# Patient Record
Sex: MALE | Race: WHITE | Employment: UNEMPLOYED | URBAN - METROPOLITAN AREA
[De-identification: names, ages, dates, MRNs, and addresses within clinical notes are randomized per-mention and may not be internally consistent; named-entity substitution may affect disease eponyms.]

---

## 2017-01-05 ENCOUNTER — OFFICE VISIT (OUTPATIENT)
Dept: INTERNAL MEDICINE CLINIC | Age: 47
End: 2017-01-05

## 2017-01-05 VITALS
HEART RATE: 78 BPM | HEIGHT: 72 IN | DIASTOLIC BLOOD PRESSURE: 106 MMHG | OXYGEN SATURATION: 94 % | SYSTOLIC BLOOD PRESSURE: 152 MMHG | BODY MASS INDEX: 42.66 KG/M2 | WEIGHT: 315 LBS | TEMPERATURE: 98.6 F | RESPIRATION RATE: 16 BRPM

## 2017-01-05 DIAGNOSIS — E11.9 CONTROLLED TYPE 2 DIABETES MELLITUS WITHOUT COMPLICATION, WITHOUT LONG-TERM CURRENT USE OF INSULIN (HCC): Primary | ICD-10-CM

## 2017-01-05 DIAGNOSIS — M54.16 LUMBAR RADICULOPATHY: ICD-10-CM

## 2017-01-05 DIAGNOSIS — M51.37 DEGENERATIVE DISC DISEASE AT L5-S1 LEVEL: ICD-10-CM

## 2017-01-05 DIAGNOSIS — I10 ESSENTIAL HYPERTENSION: ICD-10-CM

## 2017-01-05 LAB
CHOLEST SERPL-MCNC: 169 MG/DL
GLUCOSE POC: 150 MG/DL
HDLC SERPL-MCNC: 44 MG/DL
LDL CHOLESTEROL POC: 73
NON-HDL GOAL (POC): 126
TCHOL/HDL RATIO (POC): 3.9
TRIGL SERPL-MCNC: 265 MG/DL

## 2017-01-05 RX ORDER — AMLODIPINE BESYLATE 5 MG/1
5 TABLET ORAL DAILY
Qty: 30 TAB | Refills: 12 | Status: SHIPPED | OUTPATIENT
Start: 2017-01-05 | End: 2017-02-07 | Stop reason: ALTCHOICE

## 2017-01-05 RX ORDER — ACETAMINOPHEN AND CODEINE PHOSPHATE 300; 30 MG/1; MG/1
1 TABLET ORAL
Qty: 30 TAB | Refills: 0 | Status: SHIPPED | OUTPATIENT
Start: 2017-01-05 | End: 2017-03-10

## 2017-01-05 NOTE — PROGRESS NOTES
Karen Saleh is a 40 y.o. male and presents with Diabetes and Hypertension  . Subjective:  Hypertension Review:  The patient has hypertension . Diet and Lifestyle: generally follows a low sodium diet, exercises sporadically  Home BP Monitoring: is not measured at home. Pertinent ROS: taking medications as instructed, no medication side effects noted, no TIA's, no chest pain on exertion, no dyspnea on exertion, no swelling of ankles. Diabetes Mellitus Review:  He has diabetes mellitus. Diabetic ROS - medication compliance: compliant all of the time, diabetic diet compliance: compliant all of the time, home glucose monitoring: is performed. Known diabetic complications: none  Cardiovascular risk factors: family history, dyslipidemia, diabetes mellitus, obesity, hypertension  Current diabetic medications include oral agents/insulin   Eye exam current (within one year): no  Weight trend: stable  Prior visit with dietician: no  Current diet: \"healthy\" diet  in general  Current exercise: walking  Current monitoring regimen: home blood tests - daily  Home blood sugar records: trend: stable  Any episodes of hypoglycemia? no  Is He on ACE inhibitor or angiotensin II receptor blocker? Yes       Back Pain Review:  Patient presents for evaluation of low back problems. Symptoms have been present for several months and include pain in lower back (dull, mild in character;10 /10 in severity). Initial inciting event: none. Symptoms are worst: at times. Alleviating factors identifiable by patient are lying flat, medication . Exacerbating factors identifiable by patient are bending forwards, bending backwards. Treatments so far initiated by patient: medication Previous lower back problems: reported. Previous workup: x-rays  He has pains radiating to the legs  He has had therapy with no relief. .         Review of Systems  Constitutional: negative for fevers, chills, anorexia and weight loss  Eyes:   negative for visual disturbance and irritation  ENT:   negative for tinnitus,sore throat,nasal congestion,ear pains. hoarseness  Respiratory:  negative for cough, hemoptysis, dyspnea,wheezing  CV:   negative for chest pain, palpitations, lower extremity edema  GI:   negative for nausea, vomiting, diarrhea, abdominal pain,melena  Endo:               negative for polyuria,polydipsia,polyphagia,heat intolerance  Genitourinary: negative for frequency, dysuria and hematuria  Integument:  negative for rash and pruritus  Hematologic:  negative for easy bruising and gum/nose bleeding  Musculoskel: myalgias, arthralgias, back pain, muscle weakness, joint pain  Neurological:  negative for headaches, dizziness, vertigo, memory problems and gait   Behavl/Psych: negative for feelings of anxiety, depression, mood changes    Past Medical History   Diagnosis Date    Diabetes ([address]      type 2    Hypertension      History reviewed. No pertinent past surgical history. Social History     Social History    Marital status: SINGLE     Spouse name: N/A    Number of children: N/A    Years of education: N/A     Social History Main Topics    Smoking status: Current Every Day Smoker     Packs/day: 1.00    Smokeless tobacco: Never Used    Alcohol use No      Comment: occ    Drug use: No    Sexual activity: Yes     Other Topics Concern    None     Social History Narrative     History reviewed. No pertinent family history. Current Outpatient Prescriptions   Medication Sig Dispense Refill    amLODIPine (NORVASC) 5 mg tablet Take 1 Tab by mouth daily. 30 Tab 12    acetaminophen-codeine (TYLENOL #3) 300-30 mg per tablet Take 1 Tab by mouth every four (4) hours as needed for Pain. Max Daily Amount: 6 Tabs. 30 Tab 0    metFORMIN (GLUCOPHAGE) 1,000 mg tablet Take 1 Tab by mouth two (2) times daily (with meals).  60 Tab 12    losartan (COZAAR) 100 mg tablet TAKE 1 TABLET BY MOUTH EVERY DAY(NON PAYMENT OF INS) 353 Tab 0    ketoconazole (NIZORAL) 2 % topical cream Apply  to affected area two (2) times a day. 30 g 12    glipiZIDE (GLUCOTROL) 5 mg tablet Take 1 Tab by mouth two (2) times a day. 60 Tab 12    ibuprofen (MOTRIN) 800 mg tablet Take 1 Tab by mouth two (2) times daily (after meals). 60 Tab 12    metaxalone (SKELAXIN) 800 mg tablet Take 1 Tab by mouth three (3) times daily. 12 Tab 0    insulin NPH/insulin regular (NOVOLIN 70/30, HUMULIN 70/30) 100 unit/mL (70-30) injection 20 units am,20 units pm 10 mL 12     Allergies   Allergen Reactions    Iodine Hives       Objective:  Visit Vitals    BP (!) 152/106    Pulse 78    Temp 98.6 °F (37 °C) (Oral)    Resp 16    Ht 6' (1.829 m)    Wt 335 lb (152 kg)    SpO2 94%    BMI 45.43 kg/m2     Physical Exam:   General appearance - alert, well appearing, and in no distress  Mental status - alert, oriented to person, place, and time  EYE-SILVIO, EOMI, corneas normal, no foreign bodies  ENT-ENT exam normal, no neck nodes or sinus tenderness  Nose - normal and patent, no erythema, discharge or polyps  Mouth - mucous membranes moist, pharynx normal without lesions  Neck - supple, no significant adenopathy   Chest - clear to auscultation, no wheezes, rales or rhonchi, symmetric air entry   Heart - normal rate, regular rhythm, normal S1, S2, no murmurs, rubs, clicks or gallops   Abdomen - soft, nontender, nondistended, no masses or organomegaly  Lymph- no adenopathy palpable  Ext-peripheral pulses normal, no pedal edema, no clubbing or cyanosis  Skin-Warm and dry.  no hyperpigmentation, vitiligo, or suspicious lesions  Neuro -alert, oriented, normal speech, no focal findings or movement disorder noted  Neck-normal C-spine, no tenderness, full ROM without pain  Back-tenderness lower lumbar spine and sacral spine      Results for orders placed or performed in visit on 01/05/17   AMB POC GLUCOSE BLOOD, BY GLUCOSE MONITORING DEVICE   Result Value Ref Range    Glucose  mg/dL   AMB POC LIPID PROFILE   Result Value Ref Range    Cholesterol (POC) 169     Triglycerides (POC) 265     HDL Cholesterol (POC) 44     LDL Cholesterol (POC) 73     Non-HDL Goal (POC) 126     TChol/HDL Ratio (POC) 3.9        Assessment/Plan:    ICD-10-CM ICD-9-CM    1. Controlled type 2 diabetes mellitus without complication, without long-term current use of insulin (HCC) E11.9 250.00 AMB POC GLUCOSE BLOOD, BY GLUCOSE MONITORING DEVICE      AMB POC HEMOGLOBIN A1C      AMB POC LIPID PROFILE   2. Essential hypertension I10 401.9 AMB POC GLUCOSE BLOOD, BY GLUCOSE MONITORING DEVICE      AMB POC HEMOGLOBIN A1C      AMB POC LIPID PROFILE   3. Degenerative disc disease at L5-S1 level M51.36 722. [address] CONT   4. Lumbar radiculopathy M54.16 724.4 MRI LUMB SPINE W WO CONT     Orders Placed This Encounter    MRI LUMB SPINE W WO CONT     Standing Status:   Future     Standing Expiration Date:   7/7/2017     Order Specific Question:   Reason for Exam     Answer:   pain,radiculopathy     Order Specific Question:   Is Patient Allergic to Contrast Dye? Answer:   No    AMB POC GLUCOSE BLOOD, BY GLUCOSE MONITORING DEVICE    AMB POC HEMOGLOBIN A1C    AMB POC LIPID PROFILE    amLODIPine (NORVASC) 5 mg tablet     Sig: Take 1 Tab by mouth daily. Dispense:  30 Tab     Refill:  12    acetaminophen-codeine (TYLENOL #3) 300-30 mg per tablet     Sig: Take 1 Tab by mouth every four (4) hours as needed for Pain. Max Daily Amount: 6 Tabs. Dispense:  30 Tab     Refill:  0     lose weight, increase physical activity, follow low fat diet, follow low salt diet, continue present plan,Take 81mg aspirin daily  There are no Patient Instructions on file for this visit. Follow-up Disposition:  Return in about 4 weeks (around 2/2/2017), or if symptoms worsen or fail to improve. I have reviewed with the patient details of the assessment and plan and all questions were answered.  Relevent patient education was performed    An After Visit Summary was printed and given to the patient.

## 2017-01-05 NOTE — MR AVS SNAPSHOT
Visit Information Date & Time Provider Department Dept. Phone Encounter #  
 1/5/2017  1:30 PM You Sanchez MD [address] [facility] [phone] [id number] Follow-up Instructions Return in about 4 weeks (around 2/2/2017), or if symptoms worsen or fail to improve. Upcoming Health Maintenance Date Due  
 EYE EXAM RETINAL OR DILATED Q1 2/20/1981 HEMOGLOBIN A1C Q6M 12/28/2016 MICROALBUMIN Q1 5/31/2017 LIPID PANEL Q1 5/31/2017 FOOT EXAM Q1 6/28/2017 DTaP/Tdap/Td series (2 - Td) 12/15/2020 Allergies as of 1/5/2017  Review Complete On: 1/5/2017 By: You Sanchez MD  
  
 Severity Noted Reaction Type Reactions Iodine  10/21/2011    Hives Current Immunizations  Reviewed on 12/15/2015 Name Date DTaP 12/15/2010 Not reviewed this visit You Were Diagnosed With   
  
 Codes Comments Controlled type 2 diabetes mellitus without complication, without long-term current use of insulin ([facility]    -  Primary ICD-10-CM: E11.9 ICD-9-CM: 250.00 Essential hypertension     ICD-10-CM: I10 
ICD-9-CM: 401.9 Degenerative disc disease at L5-S1 level     ICD-10-CM: M51.36 
ICD-9-CM: 722.52 Lumbar radiculopathy     ICD-10-CM: M54.16 
ICD-9-CM: 724.4 Vitals BP Pulse Temp Resp Height(growth percentile) Weight(growth percentile) (!) 152/106 78 98.6 °F (37 °C) (Oral) 16 6' (1.829 m) 335 lb (152 kg) SpO2 BMI Smoking Status 94% 45.43 kg/m2 Current Every Day Smoker Vitals History BMI and BSA Data Body Mass Index Body Surface Area 45.43 kg/m 2 2.78 m 2 Preferred Pharmacy Pharmacy Name Phone [facility] [place] Your Updated Medication List  
  
   
This list is accurate as of: 1/5/17  2:18 PM.  Always use your most recent med list.  
  
  
  
  
 acetaminophen-codeine 300-30 mg per tablet Commonly known as:  TYLENOL #3 Take 1 Tab by mouth every four (4) hours as needed for Pain. Max Daily Amount: 6 Tabs. amLODIPine 5 mg tablet Commonly known as:  Garcia Mullet Take 1 Tab by mouth daily. glipiZIDE 5 mg tablet Commonly known as:  Melinda Dayhoff Take 1 Tab by mouth two (2) times a day. ibuprofen 800 mg tablet Commonly known as:  MOTRIN Take 1 Tab by mouth two (2) times daily (after meals). insulin NPH/insulin regular 100 unit/mL (70-30) injection Commonly known as:  NOVOLIN 70/30, HUMULIN 70/30  
20 units am,20 units pm  
  
 ketoconazole 2 % topical cream  
Commonly known as:  NIZORAL Apply  to affected area two (2) times a day. losartan 100 mg tablet Commonly known as:  COZAAR  
TAKE 1 TABLET BY MOUTH EVERY DAY(NON PAYMENT OF INS) metaxalone 800 mg tablet Commonly known as:  SKELAXIN Take 1 Tab by mouth three (3) times daily. metFORMIN 1,000 mg tablet Commonly known as:  GLUCOPHAGE Take 1 Tab by mouth two (2) times daily (with meals). Prescriptions Printed Refills  
 acetaminophen-codeine (TYLENOL #3) 300-30 mg per tablet 0 Sig: Take 1 Tab by mouth every four (4) hours as needed for Pain. Max Daily Amount: 6 Tabs. Class: Print Route: Oral  
  
Prescriptions Sent to Pharmacy Refills  
 amLODIPine (NORVASC) 5 mg tablet 12 Sig: Take 1 Tab by mouth daily. Class: Normal  
 Pharmacy: [facility] [address] [facility], [address] [facility] Ph #: [phone] Route: Oral  
  
We Performed the Following AMB POC GLUCOSE BLOOD, BY GLUCOSE MONITORING DEVICE [36044 CPT(R)] AMB POC HEMOGLOBIN A1C [01627 CPT(R)] AMB POC LIPID PROFILE [01183 CPT(R)] Follow-up Instructions Return in about 4 weeks (around 2/2/2017), or if symptoms worsen or fail to improve. To-Do List   
 01/05/2017 Imaging:  MRI LUMB SPINE W WO CONT Please provide this summary of care documentation to your next provider. Your primary care clinician is listed as Thuan Lowry. If you have any questions after today's visit, please call [phone].

## 2017-01-18 ENCOUNTER — HOSPITAL ENCOUNTER (OUTPATIENT)
Dept: MRI IMAGING | Age: 47
Discharge: HOME | End: 2017-01-18
Attending: INTERNAL MEDICINE
Payer: COMMERCIAL

## 2017-01-18 DIAGNOSIS — M51.37 DEGENERATIVE DISC DISEASE AT L5-S1 LEVEL: ICD-10-CM

## 2017-01-18 DIAGNOSIS — M54.16 LUMBAR RADICULOPATHY: ICD-10-CM

## 2017-01-18 PROCEDURE — 72148 MRI LUMBAR SPINE W/O DYE: CPT

## 2017-02-07 ENCOUNTER — OFFICE VISIT (OUTPATIENT)
Dept: INTERNAL MEDICINE CLINIC | Age: 47
End: 2017-02-07

## 2017-02-07 VITALS
SYSTOLIC BLOOD PRESSURE: 150 MMHG | RESPIRATION RATE: 20 BRPM | OXYGEN SATURATION: 87 % | DIASTOLIC BLOOD PRESSURE: 90 MMHG | WEIGHT: 315 LBS | TEMPERATURE: 98.7 F | HEART RATE: 84 BPM | BODY MASS INDEX: 42.66 KG/M2 | HEIGHT: 72 IN

## 2017-02-07 DIAGNOSIS — M51.06 LUMBAR DISC HERNIATION WITH MYELOPATHY: Primary | ICD-10-CM

## 2017-02-07 DIAGNOSIS — E11.9 CONTROLLED TYPE 2 DIABETES MELLITUS WITHOUT COMPLICATION, UNSPECIFIED LONG TERM INSULIN USE STATUS: ICD-10-CM

## 2017-02-07 LAB — GLUCOSE POC: 129 MG/DL

## 2017-02-07 NOTE — MR AVS SNAPSHOT
Visit Information Date & Time Provider Department Dept. Phone Encounter #  
 2/7/2017  9:30 AM You Sanchez MD [address] [facility] [phone] [id number] Follow-up Instructions Return in about 4 weeks (around 3/7/2017), or if symptoms worsen or fail to improve. Upcoming Health Maintenance Date Due  
 EYE EXAM RETINAL OR DILATED Q1 2/20/1981 HEMOGLOBIN A1C Q6M 12/28/2016 MICROALBUMIN Q1 5/31/2017 FOOT EXAM Q1 6/28/2017 LIPID PANEL Q1 1/5/2018 DTaP/Tdap/Td series (2 - Td) 12/15/2020 Allergies as of 2/7/2017  Review Complete On: 2/7/2017 By: Rosario Elias LPN Severity Noted Reaction Type Reactions Iodine  10/21/2011    Hives Current Immunizations  Reviewed on 12/15/2015 Name Date DTaP 12/15/2010 Not reviewed this visit You Were Diagnosed With   
  
 Codes Comments Lumbar disc herniation with myelopathy    -  Primary ICD-10-CM: M51.06 
ICD-9-CM: 722.73 Controlled type 2 diabetes mellitus without complication, unspecified long term insulin use status ([facility]     ICD-10-CM: E11.9 ICD-9-CM: 250.00 Vitals BP Pulse Temp Resp Height(growth percentile) Weight(growth percentile) 150/90 (BP 1 Location: Right arm, BP Patient Position: Sitting) 84 98.7 °F (37.1 °C) (Oral) 20 6' (1.829 m) (!) 356 lb (161.5 kg) SpO2 BMI Smoking Status (!) 87% 48.28 kg/m2 Current Every Day Smoker Vitals History BMI and BSA Data Body Mass Index Body Surface Area  
 48.28 kg/m 2 2.86 m 2 Preferred Pharmacy Pharmacy Name Phone [facility] [address], [place] Your Updated Medication List  
  
   
This list is accurate as of: 2/7/17 10:49 AM.  Always use your most recent med list.  
  
  
  
  
 acetaminophen-codeine 300-30 mg per tablet Commonly known as:  TYLENOL #3  
 Take 1 Tab by mouth every four (4) hours as needed for Pain. Max Daily Amount: 6 Tabs. glipiZIDE 5 mg tablet Commonly known as:  Marissa Betters Take 1 Tab by mouth two (2) times a day. ibuprofen 800 mg tablet Commonly known as:  MOTRIN Take 1 Tab by mouth two (2) times daily (after meals). insulin NPH/insulin regular 100 unit/mL (70-30) injection Commonly known as:  NOVOLIN 70/30, HUMULIN 70/30  
20 units am,20 units pm  
  
 ketoconazole 2 % topical cream  
Commonly known as:  NIZORAL Apply  to affected area two (2) times a day. losartan 100 mg tablet Commonly known as:  COZAAR  
TAKE 1 TABLET BY MOUTH EVERY DAY(NON PAYMENT OF INS) metaxalone 800 mg tablet Commonly known as:  SKELAXIN Take 1 Tab by mouth three (3) times daily. metFORMIN 1,000 mg tablet Commonly known as:  GLUCOPHAGE Take 1 Tab by mouth two (2) times daily (with meals). We Performed the Following AMB POC GLUCOSE BLOOD, BY GLUCOSE MONITORING DEVICE [71176 CPT(R)] REFERRAL TO NEUROSURGERY [DFE10 Custom] Follow-up Instructions Return in about 4 weeks (around 3/7/2017), or if symptoms worsen or fail to improve. Referral Information Referral ID Referred By Referred To  
  
 [id number] Mikhail Dillard MD   
   [facility] [place], [address] [place] [address] Phone: [phone] Fax: [phone] Visits Status Start Date End Date 1 New Request 2/7/17 2/7/18 If your referral has a status of pending review or denied, additional information will be sent to support the outcome of this decision. Patient Instructions [employer] Activation Thank you for requesting access to [employer]. Please follow the instructions below to securely access and download your online medical record. [employer] allows you to send messages to your doctor, view your test results, renew your prescriptions, schedule appointments, and more. How Do I Sign Up? 1. In your internet browser, go to [email or web address]. [employer] 
2. Click on the First Time User? Click Here link in the Sign In box. You will be redirect to the New Member Sign Up page. 3. Enter your [employer] Access Code exactly as it appears below. You will not need to use this code after youve completed the sign-up process. If you do not sign up before the expiration date, you must request a new code. MyChart Access Code: Activation code not generated Current [employer] Status: Patient Declined (This is the date your MyChart access code will ) 4. Enter the last four digits of your Social Security Number (xxxx) and Date of Birth (mm/dd/yyyy) as indicated and click Submit. You will be taken to the next sign-up page. 5. Create a [employer] ID. This will be your [employer] login ID and cannot be changed, so think of one that is secure and easy to remember. 6. Create a [employer] password. You can change your password at any time. 7. Enter your Password Reset Question and Answer. This can be used at a later time if you forget your password. 8. Enter your e-mail address. You will receive e-mail notification when new information is available in [address] 9. Click Sign Up. You can now view and download portions of your medical record. 10. Click the Download Summary menu link to download a portable copy of your medical information. Additional Information If you have questions, please visit the Frequently Asked Questions section of the [employer] website at [email or web address] Remember, [employer] is NOT to be used for urgent needs. For medical emergencies, dial 911. Please provide this summary of care documentation to your next provider. Your primary care clinician is listed as Thuan Lowry. If you have any questions after today's visit, please call [phone].

## 2017-02-07 NOTE — PATIENT INSTRUCTIONS
[employer] Activation    Thank you for requesting access to [facility]. Please follow the instructions below to securely access and download your online medical record. [employer] allows you to send messages to your doctor, view your test results, renew your prescriptions, schedule appointments, and more. How Do I Sign Up? 1. In your internet browser, go to [email or web address]  2. Click on the First Time User? Click Here link in the Sign In box. You will be redirect to the New Member Sign Up page. 3. Enter your [employer] Access Code exactly as it appears below. You will not need to use this code after youve completed the sign-up process. If you do not sign up before the expiration date, you must request a new code. [employer] Access Code: Activation code not generated  Current [employer] Status: Patient Declined (This is the date your [employer] access code will )    4. Enter the last four digits of your Social Security Number (xxxx) and Date of Birth (mm/dd/yyyy) as indicated and click Submit. You will be taken to the next sign-up page. 5. Create a [employer] ID. This will be your [employer] login ID and cannot be changed, so think of one that is secure and easy to remember. 6. Create a [employer] password. You can change your password at any time. 7. Enter your Password Reset Question and Answer. This can be used at a later time if you forget your password. 8. Enter your e-mail address. You will receive e-mail notification when new information is available in [address] 9. Click Sign Up. You can now view and download portions of your medical record. 10. Click the Download Summary menu link to download a portable copy of your medical information. Additional Information    If you have questions, please visit the Frequently Asked Questions section of the [employer] website at [email or web address] Remember, [employer] is NOT to be used for urgent needs. For medical emergencies, dial 911.

## 2017-02-07 NOTE — PROGRESS NOTES
Rosalee Watson is a 40 y.o. male and presents with Back Pain; Leg Pain; and Hypertension  . Subjective:  Hypertension Review:  The patient has hypertension . Diet and Lifestyle: generally follows a low sodium diet, exercises sporadically  Home BP Monitoring: is not measured at home. Pertinent ROS: taking medications as instructed, no medication side effects noted, no TIA's, no chest pain on exertion, no dyspnea on exertion, no swelling of ankles. Diabetes Mellitus Review:  He has diabetes mellitus. Diabetic ROS - medication compliance: compliant all of the time, diabetic diet compliance: compliant all of the time, home glucose monitoring: is performed. Known diabetic complications: none  Cardiovascular risk factors: family history, dyslipidemia, diabetes mellitus, obesity, hypertension  Current diabetic medications include oral agents/insulin   Eye exam current (within one year): no  Weight trend: stable  Prior visit with dietician: no  Current diet: \"healthy\" diet  in general  Current exercise: walking  Current monitoring regimen: home blood tests - daily  Home blood sugar records: trend: stable  Any episodes of hypoglycemia? no  Is He on ACE inhibitor or angiotensin II receptor blocker? Yes       Back Pain Review:  Patient presents for evaluation of low back problems. Symptoms have been present for several months and include pain in lower back (dull, mild in character;10 /10 in severity). Initial inciting event: none. Symptoms are worst: at times. Alleviating factors identifiable by patient are lying flat, medication . Exacerbating factors identifiable by patient are bending forwards, bending backwards. Treatments so far initiated by patient: medication Previous lower back problems: reported. Previous workup: x-rays  He has pains radiating to the legs  He has had therapy with no relief. Mabeline Sink    His most recent MRI:IMPRESSION  Impression:  Suboptimal exam secondary to motion patient body habitus.   1. Bilateral spondylolyses at L5 without subluxation. 2. Multilevel degenerative disc disease and degenerative changes. Severe  bilateral facet arthropathy at L4-L5. Moderate sized left paracentral disc  protrusion effacing the left lateral recess. Multilevel spinal canal stenosis  ranging from mild to moderate/severe, worst at L3-L4. Multilevel neuroforaminal  narrowing ranging from mild to moderate. 3. Lower than expected signal in the bone marrow on the T1 sequence. This can be  related to benign entities, such as anemia. Clinical correlation is recommended. Review of Systems  Constitutional: negative for fevers, chills, anorexia and weight loss  Eyes:   negative for visual disturbance and irritation  ENT:   negative for tinnitus,sore throat,nasal congestion,ear pains. hoarseness  Respiratory:  negative for cough, hemoptysis, dyspnea,wheezing  CV:   negative for chest pain, palpitations, lower extremity edema  GI:   negative for nausea, vomiting, diarrhea, abdominal pain,melena  Endo:               negative for polyuria,polydipsia,polyphagia,heat intolerance  Genitourinary: negative for frequency, dysuria and hematuria  Integument:  negative for rash and pruritus  Hematologic:  negative for easy bruising and gum/nose bleeding  Musculoskel: myalgias, arthralgias, back pain, muscle weakness, joint pain  Neurological:  negative for headaches, dizziness, vertigo, memory problems and gait   Behavl/Psych: negative for feelings of anxiety, depression, mood changes    Past Medical History   Diagnosis Date    Diabetes ([address]      type 2    Hypertension      History reviewed. No pertinent past surgical history.   Social History     Social History    Marital status: SINGLE     Spouse name: N/A    Number of children: N/A    Years of education: N/A     Social History Main Topics    Smoking status: Current Every Day Smoker     Packs/day: 1.00    Smokeless tobacco: Never Used    Alcohol use No      Comment: occ    Drug use: No    Sexual activity: Yes     Other Topics Concern    None     Social History Narrative     History reviewed. No pertinent family history. Current Outpatient Prescriptions   Medication Sig Dispense Refill    acetaminophen-codeine (TYLENOL #3) 300-30 mg per tablet Take 1 Tab by mouth every four (4) hours as needed for Pain. Max Daily Amount: 6 Tabs. 30 Tab 0    metFORMIN (GLUCOPHAGE) 1,000 mg tablet Take 1 Tab by mouth two (2) times daily (with meals). 60 Tab 12    glipiZIDE (GLUCOTROL) 5 mg tablet Take 1 Tab by mouth two (2) times a day. 60 Tab 12    losartan (COZAAR) 100 mg tablet TAKE 1 TABLET BY MOUTH EVERY DAY(NON PAYMENT OF INS) 353 Tab 0    ketoconazole (NIZORAL) 2 % topical cream Apply  to affected area two (2) times a day. 30 g 12    ibuprofen (MOTRIN) 800 mg tablet Take 1 Tab by mouth two (2) times daily (after meals). 60 Tab 12    metaxalone (SKELAXIN) 800 mg tablet Take 1 Tab by mouth three (3) times daily.  12 Tab 0    insulin NPH/insulin regular (NOVOLIN 70/30, HUMULIN 70/30) 100 unit/mL (70-30) injection 20 units am,20 units pm 10 mL 12     Allergies   Allergen Reactions    Iodine Hives       Objective:  Visit Vitals    /90 (BP 1 Location: Right arm, BP Patient Position: Sitting)    Pulse 84    Temp 98.7 °F (37.1 °C) (Oral)    Resp 20    Ht 6' (1.829 m)    Wt (!) 356 lb (161.5 kg)    SpO2 (!) 87%    BMI 48.28 kg/m2     Physical Exam:   General appearance - alert, well appearing, and in no distress  Mental status - alert, oriented to person, place, and time  EYE-SILVIO, EOMI, corneas normal, no foreign bodies  ENT-ENT exam normal, no neck nodes or sinus tenderness  Nose - normal and patent, no erythema, discharge or polyps  Mouth - mucous membranes moist, pharynx normal without lesions  Neck - supple, no significant adenopathy   Chest - clear to auscultation, no wheezes, rales or rhonchi, symmetric air entry   Heart - normal rate, regular rhythm, normal S1, S2, no murmurs, rubs, clicks or gallops   Abdomen - soft, nontender, nondistended, no masses or organomegaly  Lymph- no adenopathy palpable  Ext-peripheral pulses normal, no pedal edema, no clubbing or cyanosis  Skin-Warm and dry. no hyperpigmentation, vitiligo, or suspicious lesions  Neuro -alert, oriented, normal speech, no focal findings or movement disorder noted  Neck-normal C-spine, no tenderness, full ROM without pain  Back-tenderness lower lumbar spine and sacral spine      Results for orders placed or performed in visit on 02/07/17   AMB POC GLUCOSE BLOOD, BY GLUCOSE MONITORING DEVICE   Result Value Ref Range    Glucose  mg/dL       Assessment/Plan:    ICD-10-CM ICD-9-CM    1. Controlled type 2 diabetes mellitus without complication, unspecified long term insulin use status (HCC) E11.9 250.00 AMB POC GLUCOSE BLOOD, BY GLUCOSE MONITORING DEVICE     Orders Placed This Encounter    AMB POC GLUCOSE BLOOD, BY GLUCOSE MONITORING DEVICE     lose weight, increase physical activity, follow low fat diet, follow low salt diet, continue present plan,Take 81mg aspirin daily  There are no Patient Instructions on file for this visit. Follow-up Disposition: Not on File      I have reviewed with the patient details of the assessment and plan and all questions were answered. Relevent patient education was performed    An After Visit Summary was printed and given to the patient.

## 2017-03-06 ENCOUNTER — TELEPHONE (OUTPATIENT)
Dept: INTERNAL MEDICINE CLINIC | Age: 47
End: 2017-03-06

## 2017-03-06 NOTE — TELEPHONE ENCOUNTER
Patient stated that Dr Senait Betancourt was to add another  BP Rx with current medication.  Please advise and send to [facility] on [address]  Best contact number is [phone].

## 2017-03-10 ENCOUNTER — APPOINTMENT (OUTPATIENT)
Dept: GENERAL RADIOLOGY | Age: 47
DRG: 154 | End: 2017-03-10
Attending: NURSE PRACTITIONER
Payer: COMMERCIAL

## 2017-03-10 ENCOUNTER — APPOINTMENT (OUTPATIENT)
Dept: CT IMAGING | Age: 47
DRG: 154 | End: 2017-03-10
Attending: NURSE PRACTITIONER
Payer: COMMERCIAL

## 2017-03-10 ENCOUNTER — APPOINTMENT (OUTPATIENT)
Dept: NUCLEAR MEDICINE | Age: 47
DRG: 154 | End: 2017-03-10
Attending: NURSE PRACTITIONER
Payer: COMMERCIAL

## 2017-03-10 ENCOUNTER — HOSPITAL ENCOUNTER (INPATIENT)
Age: 47
LOS: 7 days | Discharge: HOME | DRG: 154 | End: 2017-03-17
Attending: EMERGENCY MEDICINE | Admitting: FAMILY MEDICINE
Payer: COMMERCIAL

## 2017-03-10 ENCOUNTER — APPOINTMENT (OUTPATIENT)
Dept: MRI IMAGING | Age: 47
DRG: 154 | End: 2017-03-10
Attending: EMERGENCY MEDICINE
Payer: COMMERCIAL

## 2017-03-10 DIAGNOSIS — E66.01 MORBID OBESITY, UNSPECIFIED OBESITY TYPE (HCC): ICD-10-CM

## 2017-03-10 DIAGNOSIS — I67.9 DISORDER OF INTRACRANIAL VENOUS SINUS: ICD-10-CM

## 2017-03-10 DIAGNOSIS — R06.00 DYSPNEA, UNSPECIFIED TYPE: ICD-10-CM

## 2017-03-10 DIAGNOSIS — R42 DIZZINESS: ICD-10-CM

## 2017-03-10 DIAGNOSIS — R09.02 HYPOXIA: Primary | ICD-10-CM

## 2017-03-10 DIAGNOSIS — I10 HTN (HYPERTENSION), MALIGNANT: ICD-10-CM

## 2017-03-10 PROBLEM — J96.01 ACUTE RESPIRATORY FAILURE WITH HYPOXIA AND HYPERCAPNIA (HCC): Status: ACTIVE | Noted: 2017-03-10

## 2017-03-10 PROBLEM — J96.02 ACUTE RESPIRATORY FAILURE WITH HYPOXIA AND HYPERCAPNIA (HCC): Status: ACTIVE | Noted: 2017-03-10

## 2017-03-10 LAB
ALBUMIN SERPL BCP-MCNC: 3.5 G/DL (ref 3.5–5)
ALBUMIN/GLOB SERPL: 0.9 {RATIO} (ref 1.1–2.2)
ALP SERPL-CCNC: 80 U/L (ref 45–117)
ALT SERPL-CCNC: 31 U/L (ref 12–78)
ANION GAP BLD CALC-SCNC: 3 MMOL/L (ref 5–15)
ARTERIAL PATENCY WRIST A: YES
ARTERIAL PATENCY WRIST A: YES
AST SERPL W P-5'-P-CCNC: 28 U/L (ref 15–37)
BASE EXCESS BLD CALC-SCNC: 10 MMOL/L
BASE EXCESS BLD CALC-SCNC: 10 MMOL/L
BASOPHILS # BLD AUTO: 0 K/UL (ref 0–0.1)
BASOPHILS # BLD: 0 % (ref 0–1)
BDY SITE: ABNORMAL
BDY SITE: ABNORMAL
BILIRUB SERPL-MCNC: 0.6 MG/DL (ref 0.2–1)
BNP SERPL-MCNC: 788 PG/ML (ref 0–125)
BUN SERPL-MCNC: 16 MG/DL (ref 6–20)
BUN/CREAT SERPL: 16 (ref 12–20)
CALCIUM SERPL-MCNC: 8.3 MG/DL (ref 8.5–10.1)
CHLORIDE SERPL-SCNC: 99 MMOL/L (ref 97–108)
CK SERPL-CCNC: 166 U/L (ref 39–308)
CO2 SERPL-SCNC: 37 MMOL/L (ref 21–32)
CREAT SERPL-MCNC: 1.03 MG/DL (ref 0.7–1.3)
D DIMER PPP FEU-MCNC: 0.91 MG/L FEU (ref 0–0.65)
EOSINOPHIL # BLD: 0.1 K/UL (ref 0–0.4)
EOSINOPHIL NFR BLD: 1 % (ref 0–7)
ERYTHROCYTE [DISTWIDTH] IN BLOOD BY AUTOMATED COUNT: 14.2 % (ref 11.5–14.5)
EST. AVERAGE GLUCOSE BLD GHB EST-MCNC: 166 MG/DL
GAS FLOW.O2 O2 DELIVERY SYS: ABNORMAL L/MIN
GAS FLOW.O2 O2 DELIVERY SYS: ABNORMAL L/MIN
GAS FLOW.O2 SETTING OXYMISER: 7 L/M
GLOBULIN SER CALC-MCNC: 3.7 G/DL (ref 2–4)
GLUCOSE BLD STRIP.AUTO-MCNC: 114 MG/DL (ref 65–100)
GLUCOSE SERPL-MCNC: 156 MG/DL (ref 65–100)
HBA1C MFR BLD: 7.4 % (ref 4.2–6.3)
HCO3 BLD-SCNC: 36.3 MMOL/L (ref 22–26)
HCO3 BLD-SCNC: 37.1 MMOL/L (ref 22–26)
HCT VFR BLD AUTO: 54.2 % (ref 36.6–50.3)
HGB BLD-MCNC: 16.3 G/DL (ref 12.1–17)
LYMPHOCYTES # BLD AUTO: 12 % (ref 12–49)
LYMPHOCYTES # BLD: 1.2 K/UL (ref 0.8–3.5)
MCH RBC QN AUTO: 29.6 PG (ref 26–34)
MCHC RBC AUTO-ENTMCNC: 30.1 G/DL (ref 30–36.5)
MCV RBC AUTO: 98.5 FL (ref 80–99)
MONOCYTES # BLD: 0.7 K/UL (ref 0–1)
MONOCYTES NFR BLD AUTO: 7 % (ref 5–13)
NEUTS SEG # BLD: 8.1 K/UL (ref 1.8–8)
NEUTS SEG NFR BLD AUTO: 80 % (ref 32–75)
O2/TOTAL GAS SETTING VFR VENT: 50 %
PCO2 BLD: 77.3 MMHG (ref 35–45)
PCO2 BLD: 87.3 MMHG (ref 35–45)
PEEP RESPIRATORY: 5 CMH2O
PH BLD: 7.24 [PH] (ref 7.35–7.45)
PH BLD: 7.28 [PH] (ref 7.35–7.45)
PIP ISTAT,IPIP: 14
PLATELET # BLD AUTO: 183 K/UL (ref 150–400)
PO2 BLD: 71 MMHG (ref 80–100)
PO2 BLD: 90 MMHG (ref 80–100)
POTASSIUM SERPL-SCNC: 4.6 MMOL/L (ref 3.5–5.1)
PROT SERPL-MCNC: 7.2 G/DL (ref 6.4–8.2)
RBC # BLD AUTO: 5.5 M/UL (ref 4.1–5.7)
SAO2 % BLD: 91 % (ref 92–97)
SAO2 % BLD: 94 % (ref 92–97)
SERVICE CMNT-IMP: ABNORMAL
SODIUM SERPL-SCNC: 139 MMOL/L (ref 136–145)
SPECIMEN TYPE: ABNORMAL
SPECIMEN TYPE: ABNORMAL
TOTAL RESP. RATE, ITRR: 24
TROPONIN I SERPL-MCNC: 0.05 NG/ML
TROPONIN I SERPL-MCNC: <0.04 NG/ML
WBC # BLD AUTO: 10.1 K/UL (ref 4.1–11.1)

## 2017-03-10 PROCEDURE — 71020 XR CHEST PA LAT: CPT

## 2017-03-10 PROCEDURE — 74011250636 HC RX REV CODE- 250/636: Performed by: NURSE PRACTITIONER

## 2017-03-10 PROCEDURE — 70450 CT HEAD/BRAIN W/O DYE: CPT

## 2017-03-10 PROCEDURE — 94640 AIRWAY INHALATION TREATMENT: CPT

## 2017-03-10 PROCEDURE — 77030013033 HC MSK BPAP/CPAP MMKA -B

## 2017-03-10 PROCEDURE — 36415 COLL VENOUS BLD VENIPUNCTURE: CPT | Performed by: FAMILY MEDICINE

## 2017-03-10 PROCEDURE — 80053 COMPREHEN METABOLIC PANEL: CPT | Performed by: EMERGENCY MEDICINE

## 2017-03-10 PROCEDURE — 82550 ASSAY OF CK (CPK): CPT | Performed by: NURSE PRACTITIONER

## 2017-03-10 PROCEDURE — 83036 HEMOGLOBIN GLYCOSYLATED A1C: CPT | Performed by: NURSE PRACTITIONER

## 2017-03-10 PROCEDURE — 99285 EMERGENCY DEPT VISIT HI MDM: CPT

## 2017-03-10 PROCEDURE — 84484 ASSAY OF TROPONIN QUANT: CPT | Performed by: EMERGENCY MEDICINE

## 2017-03-10 PROCEDURE — 94660 CPAP INITIATION&MGMT: CPT

## 2017-03-10 PROCEDURE — 82962 GLUCOSE BLOOD TEST: CPT

## 2017-03-10 PROCEDURE — 83880 ASSAY OF NATRIURETIC PEPTIDE: CPT | Performed by: NURSE PRACTITIONER

## 2017-03-10 PROCEDURE — 85379 FIBRIN DEGRADATION QUANT: CPT | Performed by: NURSE PRACTITIONER

## 2017-03-10 PROCEDURE — 74011000250 HC RX REV CODE- 250: Performed by: NURSE PRACTITIONER

## 2017-03-10 PROCEDURE — 93041 RHYTHM ECG TRACING: CPT

## 2017-03-10 PROCEDURE — 85025 COMPLETE CBC W/AUTO DIFF WBC: CPT | Performed by: EMERGENCY MEDICINE

## 2017-03-10 PROCEDURE — 93005 ELECTROCARDIOGRAM TRACING: CPT

## 2017-03-10 PROCEDURE — 96374 THER/PROPH/DIAG INJ IV PUSH: CPT

## 2017-03-10 PROCEDURE — 36600 WITHDRAWAL OF ARTERIAL BLOOD: CPT

## 2017-03-10 PROCEDURE — 65620000000 HC RM CCU GENERAL

## 2017-03-10 PROCEDURE — 82803 BLOOD GASES ANY COMBINATION: CPT

## 2017-03-10 PROCEDURE — 77030013140 HC MSK NEB VYRM -A

## 2017-03-10 RX ORDER — IPRATROPIUM BROMIDE AND ALBUTEROL SULFATE 2.5; .5 MG/3ML; MG/3ML
3 SOLUTION RESPIRATORY (INHALATION)
Status: COMPLETED | OUTPATIENT
Start: 2017-03-10 | End: 2017-03-10

## 2017-03-10 RX ORDER — HYDRALAZINE HYDROCHLORIDE 20 MG/ML
20 INJECTION INTRAMUSCULAR; INTRAVENOUS ONCE
Status: COMPLETED | OUTPATIENT
Start: 2017-03-10 | End: 2017-03-10

## 2017-03-10 RX ORDER — INSULIN LISPRO 100 [IU]/ML
INJECTION, SOLUTION INTRAVENOUS; SUBCUTANEOUS
Status: DISCONTINUED | OUTPATIENT
Start: 2017-03-10 | End: 2017-03-17 | Stop reason: HOSPADM

## 2017-03-10 RX ORDER — SODIUM CHLORIDE 0.9 % (FLUSH) 0.9 %
5-10 SYRINGE (ML) INJECTION AS NEEDED
Status: DISCONTINUED | OUTPATIENT
Start: 2017-03-10 | End: 2017-03-17 | Stop reason: HOSPADM

## 2017-03-10 RX ORDER — DEXTROSE 50 % IN WATER (D50W) INTRAVENOUS SYRINGE
12.5-25 AS NEEDED
Status: DISCONTINUED | OUTPATIENT
Start: 2017-03-10 | End: 2017-03-17 | Stop reason: HOSPADM

## 2017-03-10 RX ORDER — SODIUM CHLORIDE 0.9 % (FLUSH) 0.9 %
5-10 SYRINGE (ML) INJECTION EVERY 8 HOURS
Status: DISCONTINUED | OUTPATIENT
Start: 2017-03-10 | End: 2017-03-17 | Stop reason: HOSPADM

## 2017-03-10 RX ORDER — MAGNESIUM SULFATE 100 %
4 CRYSTALS MISCELLANEOUS AS NEEDED
Status: DISCONTINUED | OUTPATIENT
Start: 2017-03-10 | End: 2017-03-17 | Stop reason: HOSPADM

## 2017-03-10 RX ADMIN — HYDRALAZINE HYDROCHLORIDE 20 MG: 20 INJECTION INTRAMUSCULAR; INTRAVENOUS at 17:12

## 2017-03-10 RX ADMIN — Medication 10 ML: at 22:32

## 2017-03-10 RX ADMIN — IPRATROPIUM BROMIDE AND ALBUTEROL SULFATE 3 ML: .5; 2.5 SOLUTION RESPIRATORY (INHALATION) at 17:45

## 2017-03-10 NOTE — ED TRIAGE NOTES
Patient arrives from home for dizziness since last night. Patient reports talking blood pressure medication regularly. BP in triage 244/132 Reports productive cough and shortness of breath. Denies chest pain.

## 2017-03-10 NOTE — IP AVS SNAPSHOT
2700 [facility] [address] [place] [address] 
[phone] Patient: Ayanna Sharma MRN: [id number] FSU:5/17/1924 You are allergic to the following Allergen Reactions Iodine Hives Recent Documentation Height Weight BMI Smoking Status 1.829 m 156.4 kg 46.76 kg/m2 Current Every Day Smoker Emergency Contacts Name Discharge Info Relation Home Work Mobile Alaina Gaffney  Parent [1] [phone] About your hospitalization You were admitted on:  March 10, 2017 You last received care in the:  [facility] 4 IM 2 You were discharged on:  March 17, 2017 Unit phone number:  [phone] Why you were hospitalized Your primary diagnosis was:  Acute Respiratory Failure With Hypoxia And Hypercapnia (Hcc) Providers Seen During Your Hospitalizations Provider Role Specialty Primary office phone Kaleb Ge MD Attending Provider Emergency Medicine [phone] Sabrina Merino MD Attending Provider [facility] [phone] Karoline Lyn MD Attending Provider Internal Medicine [phone] Roma York MD Attending Provider Internal Medicine [phone] Your Primary Care Physician (PCP) Primary Care Physician Office Phone Office Fax [address] [phone] Follow-up Information Follow up With Details Comments Contact Info Blaise Mares MD Go on 3/23/2017 1045 am for consultation [address] [place] [phone] 
[phone] Blaise Mares MD On 4/2/2017 at 8:30 pm for sleep study [address] [place] [phone] 
[phone] Cory Victor MD   [phone] [facility] 
[phone] Maria Guadalupe Garrido MD  Call office for appointment [facility] [address] [place] [address] 
[phone] Current Discharge Medication List  
  
 START taking these medications Dose & Instructions Dispensing Information Comments Morning Noon Evening Bedtime  
 albuterol 90 mcg/actuation inhaler Commonly known as:  PROVENTIL HFA, VENTOLIN HFA, PROAIR HFA Your last dose was: Your next dose is:    
   
   
 Dose:  1 Puff Take 1 Puff by inhalation every six (6) hours as needed for Wheezing. Quantity:  1 Inhaler Refills:  0  
     
   
   
   
  
 budesonide-formoterol 160-4.5 mcg/actuation HFA inhaler Commonly known as:  SYMBICORT Your last dose was: Your next dose is:    
   
   
 Dose:  2 Puff Take 2 Puffs by inhalation two (2) times a day. Quantity:  1 Inhaler Refills:  0  
     
   
   
   
  
 hydroCHLOROthiazide 25 mg tablet Commonly known as:  HYDRODIURIL Your last dose was: Your next dose is:    
   
   
 Dose:  25 mg Take 1 Tab by mouth daily for 30 days. Quantity:  30 Tab Refills:  0  
     
   
   
   
  
 nicotine 21 mg/24 hr  
Commonly known as:  Erskin Neither Your last dose was: Your next dose is:    
   
   
 Dose:  1 Patch 1 Patch by TransDERmal route daily for 30 days. Quantity:  30 Patch Refills:  0 CONTINUE these medications which have NOT CHANGED Dose & Instructions Dispensing Information Comments Morning Noon Evening Bedtime  
 losartan 100 mg tablet Commonly known as:  COZAAR Your last dose was: Your next dose is: TAKE 1 TABLET BY MOUTH EVERY DAY(NON PAYMENT OF INS) Quantity:  353 Tab Refills:  0  
     
   
   
   
  
 metFORMIN 1,000 mg tablet Commonly known as:  GLUCOPHAGE Your last dose was: Your next dose is:    
   
   
 Dose:  1000 mg Take 1 Tab by mouth two (2) times daily (with meals). Quantity:  60 Tab Refills:  12 Where to Get Your Medications Information on where to get these meds will be given to you by the nurse or doctor. ! Ask your nurse or doctor about these medications  
  albuterol 90 mcg/actuation inhaler  
 budesonide-formoterol 160-4.5 mcg/actuation HFA inhaler  
 hydroCHLOROthiazide 25 mg tablet  
 nicotine 21 mg/24 hr  
  
  
 
  
  
Discharge Instructions Discharge Instructions PATIENT ID: Melissa Nichole MRN: [id number] YOB: 1970 DATE OF ADMISSION: 3/10/2017  3:01 PM   
DATE OF DISCHARGE: 3/17/2017 PRIMARY CARE PROVIDER: Dave Urbina MD  
 
ATTENDING PHYSICIAN: Manuel Andino MD 
DISCHARGING PROVIDER: Manuel Andino MD   
To contact this individual call [phone] and ask the  to page. If unavailable ask to be transferred the Adult Hospitalist Department. DISCHARGE DIAGNOSES Respiratory failure Obstructive sleep apnea Hypertension CONSULTATIONS: IP CONSULT TO PULMONOLOGY PROCEDURES/SURGERIES: * No surgery found * PENDING TEST RESULTS:  
At the time of discharge the following test results are still pending: none FOLLOW UP APPOINTMENTS:  
Follow-up Information Follow up With Details Comments Contact Info Zo Lowry MD Go on 3/23/2017 1045 am for consultation [address] [place] [id number] 
[phone] Zo Lowry MD On 4/2/2017 at 8:30 pm for sleep study [address] [place] [phone] 
[phone] Dave Urbina MD   [phone] [place] [address] 
[phone] Osbaldo Zamudio MD  Call office for appointment [facility]35 [facility]. [address] [place] [address] 
[phone] ADDITIONAL CARE RECOMMENDATIONS: 
· Take all medications as prescribed. Take list of medications or bottle of medications for your doctor's visit. · Keep you appointment with the above providers. You were found to have a tumor in the brain on the head CT scan. Neurosurgery recommended follow up and brain MRI as out patient. You were unable to complete the brain MRI here. · You need sleep study,we anticipate you will qualify for CPAP or BIPAP. It is very important you follow thorough this. DIET: Cardiac Diet ACTIVITY: Activity as tolerated WOUND CARE: NA 
 
EQUIPMENT needed: NA 
 
 
  
 SNF/Inpatient Rehab/LTAC Independent/assisted living Hospice Other:  
 
 
Signed: Bogdan Dye MD 
3/17/2017 
10:53 AM 
 
   
Learning About Obesity What is obesity? Obesity means having so much body fat that your health is in danger. Having too much body fat can lead to type 2 diabetes, heart disease, high blood pressure, arthritis, sleep apnea, and stroke. Even if you don't feel bad now, think about these health risks. Do they seem like a good reason to start on a new path toward a healthier weight? Or do you have another personal, powerful reason for wanting to lose weight? Whatever it is, keep it in mind. It can be hard to change eating habits and exercise habits. But with your own reason and plan, you can do it. How do you know if your weight is in the obesity range?  
To know if your weight is in the obesity range, your doctor looks at your body mass index (BMI) and waist size. Your BMI is a number that is calculated from your weight and your height. To figure your BMI for yourself, get a BMI table from your doctor or use an online tool, such as http://www.barron.com/ on the [employer] of [employer]. What causes obesity? When you take in more calories than you burn off, you gain weight. How you eat, how active you are, and other things affect how your body uses calories and whether you gain weight. If you have family members who have too much body fat, you may have inherited a tendency to gain weight. And your family also helps form your eating and lifestyle habits, which can lead to obesity. Also, our busy lives make it harder to plan and cook healthy meals. For many of us, it's easier to reach for prepared foods, go out to eat, or go to the drive-through. But these foods are often high in saturated fat and calories. Portions are often too large. What can you do to reach a healthy weight? Focus on health, not diets. Diets are hard to stay on and don't work in the long run. It is very hard to stay with a diet that includes lots of big changes in your eating habits. Instead of a diet, focus on lifestyle changes that will improve your health and achieve the right balance of energy and calories. To lose weight, you need to burn more calories than you take in. You can do it by eating healthy foods in reasonable amounts and becoming more active, even a little bit every day. Making small changes over time can add up to a lot. Make a plan for change. Many people have found that naming their reasons for change and staying focused on their plan can make a big difference. Work with your doctor to create a plan that is right for you. · Ask yourself: Torin Miranda are my personal, most powerful reasons for wanting this change? What will my life look like when I've made the change? \" · Set your long-term goal. Make it specific, such as \"I will lose x pounds. \" 
· Break your long-term goal into smaller, short-term goals. Make these small steps specific and within your reach, things you know you can do. These steps are what keep you going from day to day. How can you stay on your plan for change? Be ready. Choose to start during a time when there are few events that might trigger slip-ups, like holidays, social events, and high-stress periods. Decide on your first few steps. Most people have more success when they make small changes, one step at a time. For example, you might switch a daily candy bar to a piece of fruit, walk 10 minutes more, or add more vegetables to a meal. 
Line up your support people. Make sure you're not going to be alone as you make this change. Connect with people who understand how important it is to you. Ask family members and friends for help in keeping with your plan. And think about who could make it harder for you, and how to handle them. Try tracking. People who keep track of what they eat, feel, and do are better at losing weight. Try writing down things like: · What and how much you eat. · How you feel before and after each meal. 
· Details about each meal (like eating out or at home, eating alone, or with friends or family). · What you do to be active. Look and plan. As you track, look for patterns that you may want to change. Take note of: · When you eat and whether you skip meals. · How often you eat out. · How many fruits and vegetables you eat. · When you eat beyond feeling full. · When and why you eat for reasons other than being hungry. When you stray from your plan, don't get upset. Figure out what made you slip up and how you can fix it. Can you take medicines or have surgery to lose weight?  
Before your doctor will prescribe medicines or surgery, he or she will probably want you to be more active and follow your healthy eating plan for a period of time. These habits are key lifelong changes for managing your weight, with or without other medical treatment. And these changes can help you avoid weight-related health problems. Follow-up care is a key part of your treatment and safety. Be sure to make and go to all appointments, and call your doctor if you are having problems. It's also a good idea to know your test results and keep a list of the medicines you take. Where can you learn more? Go to http://keeley-kavin.info/. Enter N111 in the search box to learn more about \"Learning About Obesity. \" Current as of: February 16, 2016 Content Version: 11.1 © 1550-9281 [employer]. Care instructions adapted under license by [employer] (which disclaims liability or warranty for this information). If you have questions about a medical condition or this instruction, always ask your healthcare professional. [address] any warranty or liability for your use of this information. 
  
 
 
Discharge Orders None orderbolt Announcement We are excited to announce that we are making your provider's discharge notes available to you in orderbolt. You will see these notes when they are completed and signed by the physician that discharged you from your recent hospital stay. If you have any questions or concerns about any information you see in orderbolt, please call the Health Information Department where you were seen or reach out to your Primary Care Provider for more information about your plan of care. General Information Please provide this summary of care documentation to your next provider. Patient Signature:  ____________________________________________________________ Date:  ____________________________________________________________  
  
Artemio Police Provider Signature:  ____________________________________________________________ Date:  ____________________________________________________________

## 2017-03-10 NOTE — ED NOTES
Upon arrival from CT pt desated to 85% on 5L NC, O2 to 7-9 L and pt still only at about 90%. MD aware and at bedside to notify pt of results. Orders for hydralazine placed for elevated BP. Pt placed on nonrebreather to maintain 90% oxygen. PA notified and back at bedside, orders given to wean off nonrebreather and to maintain O2 89% but no lower. Pt now on 8L NC and reminded to breath through nose.

## 2017-03-10 NOTE — PROGRESS NOTES
Admission Medication Reconciliation:    Information obtained from: patient, chart    Significant PMH/Disease States:   Past Medical History:   Diagnosis Date    Diabetes ([address]     type 2    Hypertension        Chief Complaint for this Admission:  dizziness, shortness of breath    Allergies:  Iodine    Prior to Admission Medications:   Prior to Admission Medications   Prescriptions Last Dose Informant Patient Reported? Taking?   losartan (COZAAR) 100 mg tablet 3/10/2017 at Unknown time  No Yes   Sig: TAKE 1 TABLET BY MOUTH EVERY DAY(NON PAYMENT OF INS)   metFORMIN (GLUCOPHAGE) 1,000 mg tablet 3/10/2017 at Unknown time  No Yes   Sig: Take 1 Tab by mouth two (2) times daily (with meals). Facility-Administered Medications: None     Comments/Recommendations: Medication review done with patient. Removed glipizide, insulin, ketoconazole topical, and metaxolone. Patient states he has another medication for blood pressure and the dose is 5mg. He is not sure of the name but he just started taking it. No information in rx query. MD note from 2/7/17 indicates it was amlodipine but it is marked as dc'd  Patient has taken his AM medications today. Allergies reviewed.

## 2017-03-10 NOTE — IP AVS SNAPSHOT
Current Discharge Medication List  
  
START taking these medications Dose & Instructions Dispensing Information Comments Morning Noon Evening Bedtime  
 albuterol 90 mcg/actuation inhaler Commonly known as:  PROVENTIL HFA, VENTOLIN HFA, PROAIR HFA Your last dose was: Your next dose is:    
   
   
 Dose:  1 Puff Take 1 Puff by inhalation every six (6) hours as needed for Wheezing. Quantity:  1 Inhaler Refills:  0  
     
   
   
   
  
 budesonide-formoterol 160-4.5 mcg/actuation HFA inhaler Commonly known as:  SYMBICORT Your last dose was: Your next dose is:    
   
   
 Dose:  2 Puff Take 2 Puffs by inhalation two (2) times a day. Quantity:  1 Inhaler Refills:  0  
     
   
   
   
  
 hydroCHLOROthiazide 25 mg tablet Commonly known as:  HYDRODIURIL Your last dose was: Your next dose is:    
   
   
 Dose:  25 mg Take 1 Tab by mouth daily for 30 days. Quantity:  30 Tab Refills:  0  
     
   
   
   
  
 nicotine 21 mg/24 hr  
Commonly known as:  Jo Ann Hand Your last dose was: Your next dose is:    
   
   
 Dose:  1 Patch 1 Patch by TransDERmal route daily for 30 days. Quantity:  30 Patch Refills:  0 CONTINUE these medications which have NOT CHANGED Dose & Instructions Dispensing Information Comments Morning Noon Evening Bedtime  
 losartan 100 mg tablet Commonly known as:  COZAAR Your last dose was: Your next dose is: TAKE 1 TABLET BY MOUTH EVERY DAY(NON PAYMENT OF INS) Quantity:  353 Tab Refills:  0  
     
   
   
   
  
 metFORMIN 1,000 mg tablet Commonly known as:  GLUCOPHAGE Your last dose was: Your next dose is:    
   
   
 Dose:  1000 mg Take 1 Tab by mouth two (2) times daily (with meals). Quantity:  60 Tab Refills:  12 Where to Get Your Medications Information on where to get these meds will be given to you by the nurse or doctor. !  Ask your nurse or doctor about these medications  
  albuterol 90 mcg/actuation inhaler  
 budesonide-formoterol 160-4.5 mcg/actuation HFA inhaler  
 hydroCHLOROthiazide 25 mg tablet  
 nicotine 21 mg/24 hr

## 2017-03-10 NOTE — ED PROVIDER NOTES
HPI Comments: Marck Mg is a 56 y.o. male with Hx of tobacco abuse, HTN, DMII, MAXX, morbid obesity who presents ambulatory to [facility] ED with cc of dizziness, SOB, visual changes, \"heart beating in my ears\", and HA. Pt reports that over the last month he has worsening dizziness and blurring of vision with associated generalized HA which worsens with standing. Pt reports that he has noted ringing in his ears, specifically \"I can feel my heart beating in my ears\" also during this time. He denies any drainage or pain from the ears. He denies any rhinorrhea, congestion, fevers or chills. He denies any focal weakness in his extremities. He reports \"body jerking\" which is more described as tremors focal to his BL UE during the duration of his dizziness. He denies any loss of vision. He reports worsening SOB, specifically WILDER, and a chronic cough over the last 2 weeks. Pt denies any fevers or chills. He denies any nausea, vomiting, diarrhea, urinary or bowel concerns. He denies any abdominal or CP. He reports he has noted some swelling in his BLLE, which becomes more pronounced after standing. 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 He reports daily tobacco abuse, occasional ETOH use, denies any illicit drug use. He states he is adherent with his daily medications. PCP: Chacha Wild MD    There are no other complaints, changes or physical findings at this time. The history is provided by the patient. Past Medical History:   Diagnosis Date    Diabetes ([address]     type 2    Hypertension        History reviewed. No pertinent surgical history. History reviewed. No pertinent family history. Social History     Social History    Marital status: SINGLE     Spouse name: N/A    Number of children: N/A    Years of education: N/A     Occupational History    Not on file.      Social History Main Topics    Smoking status: Current Every Day Smoker     Packs/day: 1.00    Smokeless tobacco: Never Used    Alcohol use No      Comment: occ    Drug use: No    Sexual activity: Yes     Other Topics Concern    Not on file     Social History Narrative         ALLERGIES: Iodine    Review of Systems   Constitutional: Negative for activity change, appetite change, chills and fever. HENT: Negative for congestion, rhinorrhea, sinus pressure, sneezing and sore throat. Eyes: Negative for pain, discharge and visual disturbance. Respiratory: Positive for cough and shortness of breath. Cardiovascular: Positive for leg swelling. Negative for chest pain. Gastrointestinal: Negative for abdominal pain, diarrhea, nausea and vomiting. Genitourinary: Negative for dysuria, flank pain, frequency and urgency. Musculoskeletal: Negative for arthralgias, back pain, gait problem, joint swelling, myalgias and neck pain. Skin: Negative for color change and rash. Neurological: Positive for dizziness, tremors, weakness and headaches. Negative for speech difficulty, light-headedness and numbness. Psychiatric/Behavioral: Negative for agitation, behavioral problems and confusion. All other systems reviewed and are negative. Vitals:    03/10/17 1830 03/10/17 1838 03/10/17 1915 03/10/17 1945   BP: (!) 170/96  191/89 (!) 193/97   Pulse: 90  83 85   Resp: 15  18 15   Temp:       SpO2: 92% 95% 91% 94%   Weight:       Height:                Physical Exam   Constitutional: He is oriented to person, place, and time. He appears well-developed and well-nourished. No distress. HENT:   Head: Normocephalic and atraumatic. Right Ear: External ear normal.   Left Ear: External ear normal.   Nose: Nose normal.   Mouth/Throat: Oropharynx is clear and moist. No oropharyngeal exudate. Eyes: Conjunctivae and EOM are normal. Pupils are equal, round, and reactive to light. Neck: Normal range of motion. Neck supple.    Cardiovascular: Normal rate, regular rhythm, normal heart sounds and intact distal pulses. Pulmonary/Chest: Effort normal. He has decreased breath sounds in the right lower field and the left lower field. Abdominal: Soft. Bowel sounds are normal. There is no tenderness. There is no rebound and no guarding. Large/ obese      Musculoskeletal: Normal range of motion. He exhibits edema (+) 2 BLLE, below knees . Neurological: He is alert and oriented to person, place, and time. He has normal strength. No cranial nerve deficit. GCS eye subscore is 4. GCS verbal subscore is 5. GCS motor subscore is 6. Skin: Skin is warm and dry. Psychiatric: He has a normal mood and affect. His behavior is normal. Judgment and thought content normal.   Nursing note and vitals reviewed. MDM  Number of Diagnoses or Management Options  Disorder of intracranial venous sinus:   Dizziness:   Dyspnea, unspecified type:   HTN (hypertension), malignant:   Hypoxia:   Morbid obesity, unspecified obesity type [facility]):   Diagnosis management comments: DDx; PE, intracranial mass, CVA vs TIA, COPD, PNA, CHF, ACS     57 yo M presents with worsening dizziness/ SOB/ ear ringing/ tremors over several weeks. (+) CT for new sinus mass. Pt with malignant HTN initially, responsive to hydralazine in ED. CXR wtihout any emergent findings, unable to tolerate CT with contrast 2/2 iodine allergy. Awaiting VQ scan. Had transient hypoxia in ED with sats labile (low 80s post CT, requiring NRB). Needs at least 2-4 L/NC to maintain sat > 90%. Possible PE vs lung mass as etiology for s/sx.         Amount and/or Complexity of Data Reviewed  Clinical lab tests: reviewed and ordered  Tests in the radiology section of CPT®: reviewed and ordered  Review and summarize past medical records: yes  Discuss the patient with other providers: yes    Risk of Complications, Morbidity, and/or Mortality  Presenting problems: high  Diagnostic procedures: moderate  Management options: moderate    Critical Care  Total time providing critical care: 30-74 minutes (28  Min  )    Patient Progress  Patient progress: improved    ED Course       Procedures    5:11 PM  Notified by radiology of sinus mass, needs non-emergent non-cont MRI to evaluate. Ongoing oxygen requirement on RA with sats in 80s. Spoke with Dr. Micah Self, agrees for admission. Anti-HTN medication ordered. CRITICAL CARE NOTE :    6:17 PM      IMPENDING DETERIORATION -Respiratory and Cardiovascular    ASSOCIATED RISK FACTORS - Hypoxia, Dysrhythmia and CNS Decompensation    MANAGEMENT- Bedside Assessment and Supervision of Care    INTERPRETATION -  Blood Gases, ECG and Blood Pressure    INTERVENTIONS - application of Bi-PAP     TREATMENT RESPONSE -Stable    PERFORMED BY - Physician and Mid-Level    NOTES   :    I have spent 60 minutes of critical care time involved in lab review, consultations with specialist, family decision- making, bedside attention and documentation. During this entire length of time I was immediately available to the patient . LABORATORY TESTS:  Recent Results (from the past 12 hour(s))   EKG, 12 LEAD, INITIAL    Collection Time: 03/10/17  1:53 PM   Result Value Ref Range    Ventricular Rate 84 BPM    Atrial Rate 79 BPM    P-R Interval 140 ms    QRS Duration 84 ms    Q-T Interval 376 ms    QTC Calculation (Bezet) 444 ms    Calculated P Axis 68 degrees    Calculated R Axis 2 degrees    Calculated T Axis 51 degrees    Diagnosis       Undetermined rhythm  Anteroseptal infarct , age undetermined  When compared with ECG of 15-AUG-2012 17:01,  Current undetermined rhythm precludes rhythm comparison, needs review  Incomplete right bundle branch block is no longer present  Anteroseptal infarct is now present     CBC WITH AUTOMATED DIFF    Collection Time: 03/10/17  2:01 PM   Result Value Ref Range    WBC 10.1 4.1 - 11.1 K/uL    RBC 5.50 4. 10 - 5.70 M/uL    HGB 16.3 12.1 - 17.0 g/dL    HCT 54.2 (H) 36.6 - 50.3 %    MCV 98.5 80.0 - 99.0 FL    MCH 29.6 26.0 - 34.0 PG    MCHC 30.1 30.0 - 36.5 g/dL    RDW 14.2 11.5 - 14.5 %    PLATELET 384 777 - 122 K/uL    NEUTROPHILS 80 (H) 32 - 75 %    LYMPHOCYTES 12 12 - 49 %    MONOCYTES 7 5 - 13 %    EOSINOPHILS 1 0 - 7 %    BASOPHILS 0 0 - 1 %    ABS. NEUTROPHILS 8.1 (H) 1.8 - 8.0 K/UL    ABS. LYMPHOCYTES 1.2 0.8 - 3.5 K/UL    ABS. MONOCYTES 0.7 0.0 - 1.0 K/UL    ABS. EOSINOPHILS 0.1 0.0 - 0.4 K/UL    ABS. BASOPHILS 0.0 0.0 - 0.1 K/UL   METABOLIC PANEL, COMPREHENSIVE    Collection Time: 03/10/17  2:01 PM   Result Value Ref Range    Sodium 139 136 - 145 mmol/L    Potassium 4.6 3.5 - 5.1 mmol/L    Chloride 99 97 - 108 mmol/L    CO2 37 (H) 21 - 32 mmol/L    Anion gap 3 (L) 5 - 15 mmol/L    Glucose 156 (H) 65 - 100 mg/dL    BUN 16 6 - 20 MG/DL    Creatinine 1.03 0.70 - 1.30 MG/DL    BUN/Creatinine ratio 16 12 - 20      GFR est AA >60 >60 ml/min/1.73m2    GFR est non-AA >60 >60 ml/min/1.73m2    Calcium 8.3 (L) 8.5 - 10.1 MG/DL    Bilirubin, total 0.6 0.2 - 1.0 MG/DL    ALT (SGPT) 31 12 - 78 U/L    AST (SGOT) 28 15 - 37 U/L    Alk.  phosphatase 80 45 - 117 U/L    Protein, total 7.2 6.4 - 8.2 g/dL    Albumin 3.5 3.5 - 5.0 g/dL    Globulin 3.7 2.0 - 4.0 g/dL    A-G Ratio 0.9 (L) 1.1 - 2.2     TROPONIN I    Collection Time: 03/10/17  2:01 PM   Result Value Ref Range    Troponin-I, Qt. 0.05 (H) <0.05 ng/mL   CK W/ REFLX CKMB    Collection Time: 03/10/17  2:01 PM   Result Value Ref Range     39 - 308 U/L   D DIMER    Collection Time: 03/10/17  2:01 PM   Result Value Ref Range    D-dimer 0.91 (H) 0.00 - 0.65 mg/L FEU   PRO-BNP    Collection Time: 03/10/17  2:01 PM   Result Value Ref Range    NT pro- (H) 0 - 125 PG/ML   POC G3 - PUL    Collection Time: 03/10/17  6:04 PM   Result Value Ref Range    pH (POC) 7.280 (L) 7.35 - 7.45      pCO2 (POC) 77.3 (H) 35.0 - 45.0 MMHG    pO2 (POC) 71 (L) 80 - 100 MMHG    HCO3 (POC) 36.3 (H) 22 - 26 MMOL/L    sO2 (POC) 91 (L) 92 - 97 %    Base excess (POC) 10 mmol/L    Site RIGHT RADIAL      Device: NASAL CANNULA      Flow rate (POC) 7 L/M    Allens test (POC) YES      Specimen type (POC) ARTERIAL     ECG RHYTHM ANALYSIS ADULT    Collection Time: 03/10/17  8:22 PM   Result Value Ref Range    Ventricular Rate 67 BPM    Atrial Rate 67 BPM    P-R Interval 128 ms    QRS Duration 86 ms    Q-T Interval 362 ms    QTC Calculation (Bezet) 382 ms    Calculated P Axis 68 degrees    Calculated R Axis 148 degrees    Calculated T Axis 40 degrees    Diagnosis       Sinus rhythm with marked sinus arrhythmia  Left posterior fascicular block  Anteroseptal infarct , age undetermined  When compared with ECG of 10-MAR-2017 13:53,  MANUAL COMPARISON REQUIRED, DATA IS UNCONFIRMED         IMAGING RESULTS:  CT HEAD WO CONT   Final Result   HEAD CT WITHOUT CONTRAST: 3/10/2017 4:52 PM     INDICATION: Headache. HISTORY (per electronic medical record): Dizziness since last night,  hypertension (244/132).     COMPARISON: None.     PROCEDURE: Axial images of the head were obtained without contrast. Coronal and  sagittal reformats were performed. CT dose reduction was achieved through use of  a standardized protocol tailored for this examination and automatic exposure  control for dose modulation. Adaptive statistical iterative reconstruction  (ASIR) was utilized.     FINDINGS: In the right cavernous sinus, there is a mass which extends into the  right sella (400-34, 401-41, 2-11). There is questionable thinning of the right  sphenoid sinus wall (400-36 on bone window). The ventricles and sulci are  appropriate in size and configuration for age. No loss of gray-white  differentiation to suggest late acute or early subacute infarction. No mass  effect or intracranial hemorrhage.     IMPRESSION  IMPRESSION: Right cavernous sinus mass. MRI with IV contrast is recommended for  further evaluation. Please put in the indication \"attention sella. \"    XR CHEST PA LAT   Final Result   PA AND LATERAL CHEST RADIOGRAPHS: 3/10/2017 4:02 PM     INDICATION: Dyspnea.    HISTORY (per electronic medical record): Dizziness, dyspnea, hypertension,  productive cough.      COMPARISON: 10/21/2011, 2/18/2010.     TECHNIQUE: Frontal and lateral radiographs of the chest.     FINDINGS:  The radiographs are significantly underpenetrated secondary to patient body  habitus, even with full PA and lateral radiation technique. Hazy opacity over  the bilateral lower lungs is likely due to overlying breast attenuation. Airspace disease in the right lower lung field is possible. The trachea is  patent. The main stem bronchi are obscured by body habitus. The heart is at the  upper limits of normal in size. No large pneumothorax or pleural effusion.     IMPRESSION  IMPRESSION:   Underpenetrated due to patient body habitus. Hazy lower lung opacity likely due  to overlying breast attenuation. NM LUNG VENT/PERF    (Results Pending)   MRI BRAIN W CONT    (Results Pending)       MEDICATIONS GIVEN:  Medications   hydrALAZINE (APRESOLINE) 20 mg/mL injection 20 mg (20 mg IntraVENous Given 3/10/17 1712)   albuterol-ipratropium (DUO-NEB) 2.5 MG-0.5 MG/3 ML (3 mL Nebulization Given 3/10/17 7255)       IMPRESSION:  1. Hypoxia    2. Morbid obesity, unspecified obesity type ([address]    3. HTN (hypertension), malignant    4. Dizziness    5. Dyspnea, unspecified type    6. Disorder of intracranial venous sinus        PLAN:  Admit Note:  8:29 PM  Patient is being admitted to the hospital by Dr. Samson Elizabeth.  The results of their tests and reasons for their admission have been discussed with them and/or available family. They convey agreement and understanding for the need to be admitted and for their admission diagnosis. Consultation has been made with the inpatient physician specialist for hospitalization. CONSULT NOTE:  7:18 PM Marylu Laws MD spoke with Dr. Samson Elizabeth, Consult for Hospitalist.  Discussed available diagnostic tests and clinical findings. He is in agreement with care plans as outlined.   Dr. Samson Elizabeth recommends consulting neurosurgery.     Perry Keane NP

## 2017-03-11 LAB
ANION GAP BLD CALC-SCNC: 2 MMOL/L (ref 5–15)
APPEARANCE UR: CLEAR
ARTERIAL PATENCY WRIST A: ABNORMAL
ARTERIAL PATENCY WRIST A: YES
ATRIAL RATE: 67 BPM
ATRIAL RATE: 79 BPM
BACTERIA URNS QL MICRO: NEGATIVE /HPF
BASE EXCESS BLD CALC-SCNC: 12 MMOL/L
BASE EXCESS BLD CALC-SCNC: 9 MMOL/L
BASOPHILS # BLD AUTO: 0.1 K/UL (ref 0–0.1)
BASOPHILS # BLD: 1 % (ref 0–1)
BDY SITE: ABNORMAL
BDY SITE: ABNORMAL
BILIRUB UR QL: NEGATIVE
BUN SERPL-MCNC: 17 MG/DL (ref 6–20)
BUN/CREAT SERPL: 20 (ref 12–20)
CALCIUM SERPL-MCNC: 8 MG/DL (ref 8.5–10.1)
CALCULATED P AXIS, ECG09: 68 DEGREES
CALCULATED P AXIS, ECG09: 68 DEGREES
CALCULATED R AXIS, ECG10: 148 DEGREES
CALCULATED R AXIS, ECG10: 2 DEGREES
CALCULATED T AXIS, ECG11: 40 DEGREES
CALCULATED T AXIS, ECG11: 51 DEGREES
CHLORIDE SERPL-SCNC: 101 MMOL/L (ref 97–108)
CO2 SERPL-SCNC: 36 MMOL/L (ref 21–32)
COLOR UR: NORMAL
CREAT SERPL-MCNC: 0.83 MG/DL (ref 0.7–1.3)
DIAGNOSIS, 93000: NORMAL
DIAGNOSIS, 93000: NORMAL
EOSINOPHIL # BLD: 0.1 K/UL (ref 0–0.4)
EOSINOPHIL NFR BLD: 1 % (ref 0–7)
EPITH CASTS URNS QL MICRO: NORMAL /LPF
ERYTHROCYTE [DISTWIDTH] IN BLOOD BY AUTOMATED COUNT: 14.5 % (ref 11.5–14.5)
GAS FLOW.O2 O2 DELIVERY SYS: ABNORMAL L/MIN
GAS FLOW.O2 O2 DELIVERY SYS: ABNORMAL L/MIN
GLUCOSE BLD STRIP.AUTO-MCNC: 120 MG/DL (ref 65–100)
GLUCOSE BLD STRIP.AUTO-MCNC: 83 MG/DL (ref 65–100)
GLUCOSE BLD STRIP.AUTO-MCNC: 95 MG/DL (ref 65–100)
GLUCOSE BLD STRIP.AUTO-MCNC: 97 MG/DL (ref 65–100)
GLUCOSE SERPL-MCNC: 100 MG/DL (ref 65–100)
GLUCOSE UR STRIP.AUTO-MCNC: NEGATIVE MG/DL
HCO3 BLD-SCNC: 36.4 MMOL/L (ref 22–26)
HCO3 BLD-SCNC: 39.2 MMOL/L (ref 22–26)
HCT VFR BLD AUTO: 49.5 % (ref 36.6–50.3)
HGB BLD-MCNC: 14.9 G/DL (ref 12.1–17)
HGB UR QL STRIP: NEGATIVE
HYALINE CASTS URNS QL MICRO: NORMAL /LPF (ref 0–5)
KETONES UR QL STRIP.AUTO: NEGATIVE MG/DL
LEUKOCYTE ESTERASE UR QL STRIP.AUTO: NEGATIVE
LYMPHOCYTES # BLD AUTO: 15 % (ref 12–49)
LYMPHOCYTES # BLD: 1.3 K/UL (ref 0.8–3.5)
MCH RBC QN AUTO: 29.9 PG (ref 26–34)
MCHC RBC AUTO-ENTMCNC: 30.1 G/DL (ref 30–36.5)
MCV RBC AUTO: 99.2 FL (ref 80–99)
MONOCYTES # BLD: 0.9 K/UL (ref 0–1)
MONOCYTES NFR BLD AUTO: 11 % (ref 5–13)
NEUTS SEG # BLD: 6 K/UL (ref 1.8–8)
NEUTS SEG NFR BLD AUTO: 72 % (ref 32–75)
NITRITE UR QL STRIP.AUTO: NEGATIVE
O2/TOTAL GAS SETTING VFR VENT: 50 %
O2/TOTAL GAS SETTING VFR VENT: 50 %
P-R INTERVAL, ECG05: 128 MS
P-R INTERVAL, ECG05: 140 MS
PCO2 BLD: 83.3 MMHG (ref 35–45)
PCO2 BLD: 85.6 MMHG (ref 35–45)
PEEP RESPIRATORY: 8 CMH2O
PEEP RESPIRATORY: 8 CMH2O
PH BLD: 7.24 [PH] (ref 7.35–7.45)
PH BLD: 7.28 [PH] (ref 7.35–7.45)
PH UR STRIP: 5.5 [PH] (ref 5–8)
PIP ISTAT,IPIP: 16
PLATELET # BLD AUTO: 168 K/UL (ref 150–400)
PO2 BLD: 76 MMHG (ref 80–100)
PO2 BLD: 76 MMHG (ref 80–100)
POTASSIUM SERPL-SCNC: 4 MMOL/L (ref 3.5–5.1)
PRESSURE SUPPORT SETTING VENT: 8 CMH2O
PROT UR STRIP-MCNC: NEGATIVE MG/DL
Q-T INTERVAL, ECG07: 362 MS
Q-T INTERVAL, ECG07: 376 MS
QRS DURATION, ECG06: 84 MS
QRS DURATION, ECG06: 86 MS
QTC CALCULATION (BEZET), ECG08: 382 MS
QTC CALCULATION (BEZET), ECG08: 444 MS
RBC # BLD AUTO: 4.99 M/UL (ref 4.1–5.7)
RBC #/AREA URNS HPF: NORMAL /HPF (ref 0–5)
SAO2 % BLD: 91 % (ref 92–97)
SAO2 % BLD: 92 % (ref 92–97)
SERVICE CMNT-IMP: ABNORMAL
SERVICE CMNT-IMP: NORMAL
SODIUM SERPL-SCNC: 139 MMOL/L (ref 136–145)
SP GR UR REFRACTOMETRY: 1.01 (ref 1–1.03)
SPECIMEN TYPE: ABNORMAL
SPECIMEN TYPE: ABNORMAL
SPONTANEOUS TIMED, IST: YES
TOTAL RESP. RATE, ITRR: 27
TOTAL RESP. RATE, ITRR: 29
TROPONIN I SERPL-MCNC: 0.04 NG/ML
TROPONIN I SERPL-MCNC: <0.04 NG/ML
UA: UC IF INDICATED,UAUC: NORMAL
UROBILINOGEN UR QL STRIP.AUTO: 0.2 EU/DL (ref 0.2–1)
VENTRICULAR RATE, ECG03: 67 BPM
VENTRICULAR RATE, ECG03: 84 BPM
WBC # BLD AUTO: 8.3 K/UL (ref 4.1–11.1)
WBC URNS QL MICRO: NORMAL /HPF (ref 0–4)

## 2017-03-11 PROCEDURE — 94660 CPAP INITIATION&MGMT: CPT

## 2017-03-11 PROCEDURE — 74011250636 HC RX REV CODE- 250/636: Performed by: FAMILY MEDICINE

## 2017-03-11 PROCEDURE — 94003 VENT MGMT INPAT SUBQ DAY: CPT

## 2017-03-11 PROCEDURE — 84484 ASSAY OF TROPONIN QUANT: CPT | Performed by: FAMILY MEDICINE

## 2017-03-11 PROCEDURE — 77030013140 HC MSK NEB VYRM -A

## 2017-03-11 PROCEDURE — 77010033678 HC OXYGEN DAILY

## 2017-03-11 PROCEDURE — 74011250637 HC RX REV CODE- 250/637: Performed by: NURSE PRACTITIONER

## 2017-03-11 PROCEDURE — 85025 COMPLETE CBC W/AUTO DIFF WBC: CPT | Performed by: FAMILY MEDICINE

## 2017-03-11 PROCEDURE — 82803 BLOOD GASES ANY COMBINATION: CPT

## 2017-03-11 PROCEDURE — 74011250637 HC RX REV CODE- 250/637: Performed by: INTERNAL MEDICINE

## 2017-03-11 PROCEDURE — 36415 COLL VENOUS BLD VENIPUNCTURE: CPT | Performed by: FAMILY MEDICINE

## 2017-03-11 PROCEDURE — 77030012879 HC MSK CPAP FLL FAC PHIL -B

## 2017-03-11 PROCEDURE — 74011000250 HC RX REV CODE- 250: Performed by: INTERNAL MEDICINE

## 2017-03-11 PROCEDURE — 65620000000 HC RM CCU GENERAL

## 2017-03-11 PROCEDURE — 36600 WITHDRAWAL OF ARTERIAL BLOOD: CPT

## 2017-03-11 PROCEDURE — 74011250636 HC RX REV CODE- 250/636: Performed by: HOSPITALIST

## 2017-03-11 PROCEDURE — 94640 AIRWAY INHALATION TREATMENT: CPT

## 2017-03-11 PROCEDURE — 80048 BASIC METABOLIC PNL TOTAL CA: CPT | Performed by: FAMILY MEDICINE

## 2017-03-11 PROCEDURE — 93306 TTE W/DOPPLER COMPLETE: CPT

## 2017-03-11 PROCEDURE — 77030029684 HC NEB SM VOL KT MONA -A

## 2017-03-11 PROCEDURE — 82962 GLUCOSE BLOOD TEST: CPT

## 2017-03-11 PROCEDURE — 93970 EXTREMITY STUDY: CPT

## 2017-03-11 PROCEDURE — 81001 URINALYSIS AUTO W/SCOPE: CPT | Performed by: FAMILY MEDICINE

## 2017-03-11 RX ORDER — FUROSEMIDE 10 MG/ML
40 INJECTION INTRAMUSCULAR; INTRAVENOUS ONCE
Status: COMPLETED | OUTPATIENT
Start: 2017-03-11 | End: 2017-03-11

## 2017-03-11 RX ORDER — ENOXAPARIN SODIUM 100 MG/ML
1 INJECTION SUBCUTANEOUS EVERY 24 HOURS
Status: DISCONTINUED | OUTPATIENT
Start: 2017-03-11 | End: 2017-03-12

## 2017-03-11 RX ORDER — BUDESONIDE 0.5 MG/2ML
500 INHALANT ORAL
Status: DISCONTINUED | OUTPATIENT
Start: 2017-03-11 | End: 2017-03-17 | Stop reason: HOSPADM

## 2017-03-11 RX ORDER — LOSARTAN POTASSIUM 50 MG/1
100 TABLET ORAL DAILY
Status: DISCONTINUED | OUTPATIENT
Start: 2017-03-12 | End: 2017-03-17 | Stop reason: HOSPADM

## 2017-03-11 RX ORDER — FAMOTIDINE 20 MG/1
20 TABLET, FILM COATED ORAL 2 TIMES DAILY
Status: DISCONTINUED | OUTPATIENT
Start: 2017-03-11 | End: 2017-03-17 | Stop reason: HOSPADM

## 2017-03-11 RX ORDER — IPRATROPIUM BROMIDE AND ALBUTEROL SULFATE 2.5; .5 MG/3ML; MG/3ML
3 SOLUTION RESPIRATORY (INHALATION)
Status: DISCONTINUED | OUTPATIENT
Start: 2017-03-11 | End: 2017-03-13

## 2017-03-11 RX ADMIN — Medication 10 ML: at 06:29

## 2017-03-11 RX ADMIN — IPRATROPIUM BROMIDE AND ALBUTEROL SULFATE 3 ML: .5; 3 SOLUTION RESPIRATORY (INHALATION) at 21:39

## 2017-03-11 RX ADMIN — IPRATROPIUM BROMIDE AND ALBUTEROL SULFATE 3 ML: .5; 3 SOLUTION RESPIRATORY (INHALATION) at 15:58

## 2017-03-11 RX ADMIN — LOSARTAN POTASSIUM 100 MG: 50 TABLET ORAL at 23:45

## 2017-03-11 RX ADMIN — FUROSEMIDE 40 MG: 10 INJECTION, SOLUTION INTRAMUSCULAR; INTRAVENOUS at 03:49

## 2017-03-11 RX ADMIN — FAMOTIDINE 20 MG: 20 TABLET ORAL at 12:50

## 2017-03-11 RX ADMIN — Medication 10 ML: at 21:59

## 2017-03-11 RX ADMIN — BUDESONIDE 500 MCG: 0.5 INHALANT RESPIRATORY (INHALATION) at 21:40

## 2017-03-11 RX ADMIN — Medication 10 ML: at 17:29

## 2017-03-11 RX ADMIN — FAMOTIDINE 20 MG: 20 TABLET ORAL at 17:29

## 2017-03-11 RX ADMIN — ENOXAPARIN SODIUM 160 MG: 80 INJECTION SUBCUTANEOUS at 12:50

## 2017-03-11 NOTE — PROGRESS NOTES
TRANSFER - IN REPORT:    Verbal report received from Ralph Meraz (name) on Ayanna Sharma  being received from ED(unit) for routine progression of care      Report consisted of patients Situation, Background, Assessment and   Recommendations(SBAR). Information from the following report(s) SBAR, Kardex, ED Summary, Intake/Output, MAR, Accordion and Recent Results was reviewed with the receiving nurse. Opportunity for questions and clarification was provided. Assessment completed upon patients arrival to unit and care assumed. Primary Nurse Ashtyn Worley RN and Amelia Chu RN performed a dual skin assessment on this patient No impairment noted  Anthony score is 19      2200 - Pt transported from ED, assumed pt care. Pt alert, VSS on Bipap, no c/o pain. BP 150s/90s, hold off on Cardene gtt for now, will monitor. 2300 - CHG bath given  0000 - VSS on Bipap, O2 saturations 90-95%, rapid desaturation when Bipap removed. -150/, improving w/o cardene gtt  0300 - pt resting comfortably, VSS on Bipap, tolerating Bipap. repeat ABG'S results critical, Hospitallist made aware, no new orders, will monitor. 0600- UA collected    Bedside and Verbal shift change report given to Shahzad Cazares (oncoming nurse) by CARA (offgoing nurse). Report included the following information SBAR, Kardex, Intake/Output, MAR, Accordion and Recent Results.

## 2017-03-11 NOTE — PROGRESS NOTES
Bedside and Verbal shift change report given to Iam Carbajal RN (oncoming nurse) by Emy Meek (offgoing nurse). Report included the following information SBAR, Kardex, Intake/Output, MAR, Accordion, Recent Results, Med Rec Status, Cardiac Rhythm NSR and Alarm Parameters . 0830-Pt drowsy but arousable, A&O x4, tolerating bipap, VSS, for VQ scan today. Doppler study underway. 1045-Dr. Sheila Whiteside called for new consult, spoke with Irene RT about bipap changes, aware of current ABG. 1500-Pt able to tolerate nasal cannula for about 45 minutes. He ate lunch and sat on the side of the bed, pretty fatigued after that, VSS, no other changes to assessment. 1845-Pt complaining of involuntary twitching that has been going on for \"at least a month. \"  Physician aware. 1930-Bedside and Verbal shift change report given to [address] (oncoming nurse) by Iam Carbajal RN (offgoing nurse). Report included the following information SBAR, Kardex, Intake/Output, MAR, Accordion, Recent Results, Med Rec Status and Cardiac Rhythm nsr.

## 2017-03-11 NOTE — PROCEDURES
[facility]  *** FINAL REPORT ***    Name: Main Ac  MRN: [id number]    Inpatient  : 1970  HIS Order #: [id number]  [id number] [facility] Visit #: [id number]  Date: 11 Mar 2017    TYPE OF TEST: Peripheral Venous Testing    REASON FOR TEST  Limb swelling    Right Leg:-  Deep venous thrombosis:           No  Superficial venous thrombosis:    No  Deep venous insufficiency:        Not examined  Superficial venous insufficiency: Not examined    Left Leg:-  Deep venous thrombosis:           No  Superficial venous thrombosis:    No  Deep venous insufficiency:        Not examined  Superficial venous insufficiency: Not examined      INTERPRETATION/FINDINGS  PROCEDURE:  Color duplex ultrasound imaging of lower extremity veins. FINDINGS:       Right: The common femoral, deep femoral, femoral, popliteal,  posterior tibial, peroneal, and great saphenous are patent and without   evidence of thrombus;  each is fully compressible and there is no  narrowing of the flow channel on color Doppler imaging. Phasic flow  is observed in the common femoral vein. Left:   The common femoral, deep femoral, femoral, popliteal,  posterior tibial, peroneal, and great saphenous are patent and without   evidence of thrombus;  each is fully compressible and there is no  narrowing of the flow channel on color Doppler imaging. Phasic flow  is observed in the common femoral vein. IMPRESSION:  No evidence of right or left lower extremity vein  thrombosis. ADDITIONAL COMMENTS    I have personally reviewed the data relevant to the interpretation of  this  study.     TECHNOLOGIST: Deyanira Calixto RVT  Signed: 2017 09:11 AM    PHYSICIAN: Cecile Kaur MD  Signed: 2017 02:07 PM

## 2017-03-11 NOTE — PROGRESS NOTES
Hospitalist Progress Note  Caitlyn Valenzuela MD  Office: [phone]        Date of Service:  3/11/2017  NAME:  Catherine Beck  :  1970  MRN:  [id number]      Admission Summary:   43-year-old white male with past medical history of type 2 diabetes mellitus, hypertension and morbid   obesity, who presented to theED with complaint of shortness of breath, dizziness,headache,blurred vision,  ringing in his ears,and \"body jerking,\" tremors. Initial recorded vital signs were blood pressure 123/121, heart   rate 90, respiratory rate 20, O2 saturation 80% on room air. He had elevated D-dimer of 0.91. The patient was placed on BiPAP in ED, so he was not able to obtain a nuclear medicine VQ scan or MRI brain in ED. CT of the head without contrast showed right cavernous sinus mass, for which MRI was recommended in order to evaluate for possible sella lesions. Interval history / Subjective:   Doing ok. remains on bipap. Per RN, as soon as bipap is taken off, pt desats within seconds as low as 70s% range. Pt stating hes hungry     Assessment & Plan:     Acute hypoxic hypercapnic respiratory failure.   -continued on BiPAP, wean as romeo  -pulm c/s for bipap mgmt  -attempt to obtain vq scan today or cta. If unable to obtai  -will start full dose lovenox to empirically treat until able to r/o pe     Altered mental status.   -suspect co2 narcosis. On bipap  -neurovascular checks. -rechk abg this am, wean bipap as romeo  CT of the head without contrast Right cavernous sinus mass, possible sella lesion.      Right cavernous sinus mass  -noted as incidental finding on ct head as above  -pend MRI brain to r/o sella lesion once able to wean off bipap  -neurosx c/s on admit    Elevated BNP (788)  -cxr no effusions, no pulm edema  -pend echocardiograms.   -Lasix ordered on admit, cont for now unitl echo back  -will f/u cta if able to obtain vs vq scan  -on strict I's and O's, and daily weights. Generalized weakness and debility, Dizziness  -fall precautions.   -consider c/s PT when able to wean off bipap  -f/u pend imagine studies    Type 2 diabetes mellitus., HbA1c 7.4  -SSI  -cont hold metformin    Mildly Elevated troponin.   -in setting of resp distress. Initial trop 0.05 indeterminate range and repeat 0.04 and 0.04 normal. No indic of mi    Malignant hypertension  -Cardene IV infusion, titrate to keep systolic blood pressure less than 140. Will titrate off today if romeo  -takes cozaar at home    Chronic bilateral lower extremity edema.   -Dopplers BLE neg for dvt    Tobacco abuse.   -Encourage smoking cessation.     Code status: full  DVT prophylaxis full dose lovenox    Care Plan discussed with: Patient/Family and Nurse  Disposition: TBD     Hospital Problems  Date Reviewed: 3/10/2017          Codes Class Noted POA    * (Principal)Acute respiratory failure with hypoxia and hypercapnia (HCC) ICD-10-CM: J96.01, J96.02  ICD-9-CM: 518.81  3/10/2017 Unknown                Review of Systems:   A comprehensive review of systems was negative except for that written in the HPI. Vital Signs:    Last 24hrs VS reviewed since prior progress note. Most recent are:  Visit Vitals    /72    Pulse 75    Temp 98.1 °F (36.7 °C)    Resp 29    Ht 6' (1.829 m)    Wt (!) 165.5 kg (364 lb 13.8 oz)    SpO2 94%    BMI 49.48 kg/m2         Intake/Output Summary (Last 24 hours) at 03/11/17 0904  Last data filed at 03/11/17 0600   Gross per 24 hour   Intake              480 ml   Output              950 ml   Net             -470 ml        Physical Examination:             Constitutional:  No acute distress, cooperative, pleasant    ENT:  Oral mucous moist, oropharynx benign. Neck supple,    Resp:  CTA bilaterally. No wheezing/rhonchi/rales. No accessory muscle use   CV:  Regular rhythm, normal rate, no murmurs, gallops, rubs    GI:  Soft, non distended, non tender. normoactive bowel sounds, no hepatosplenomegaly     Musculoskeletal:  No edema, warm, 2+ pulses throughout    Neurologic:  Moves all extremities. AAOx3, CN II-XII reviewed            Data Review:    Review and/or order of clinical lab test      Labs:     Recent Labs      03/11/17   0345  03/10/17   1401   WBC  8.3  10.1   HGB  14.9  16.3   HCT  49.5  54.2*   PLT  168  183     Recent Labs      03/11/17   0345  03/10/17   1401   NA  139  139   K  4.0  4.6   CL  101  99   CO2  36*  37*   BUN  17  16   CREA  0.83  1.03   GLU  100  156*   CA  8.0*  8.3*     Recent Labs      03/10/17   1401   SGOT  28   ALT  31   AP  80   TBILI  0.6   TP  7.2   ALB  3.5   GLOB  3.7     No results for input(s): INR, PTP, APTT in the last 72 hours. No lab exists for component: INREXT   No results for input(s): FE, TIBC, PSAT, FERR in the last 72 hours. No results found for: FOL, RBCF   No results for input(s): PH, PCO2, PO2 in the last 72 hours.   Recent Labs      03/11/17   0345  03/10/17   2130  03/10/17   1401   TROIQ  0.04  <0.04  0.05*     Lab Results   Component Value Date/Time    Cholesterol, total 182 10/22/2011 04:40 AM    HDL Cholesterol 26 10/22/2011 04:40 AM    LDL, calculated 79.8 10/22/2011 04:40 AM    Triglyceride 381 10/22/2011 04:40 AM    CHOL/HDL Ratio 7.0 10/22/2011 04:40 AM     Lab Results   Component Value Date/Time    Glucose (POC) 95 03/11/2017 06:25 AM    Glucose (POC) 114 03/10/2017 10:31 PM    Glucose (POC) 218 08/15/2012 05:28 PM    Glucose (POC) 224 08/15/2012 04:45 PM    Glucose (POC) 268 10/23/2011 11:22 AM    Glucose (POC) 246 10/23/2011 07:25 AM    Glucose  02/07/2017 10:14 AM    Glucose  01/05/2017 01:57 PM    Glucose  05/31/2016 11:46 AM    Glucose  02/24/2016 03:26 PM    Glucose  08/20/2015 11:28 AM     Lab Results   Component Value Date/Time    Color YELLOW/STRAW 03/11/2017 06:00 AM    Appearance CLEAR 03/11/2017 06:00 AM    Specific gravity 1.009 03/11/2017 06:00 AM    Specific gravity >1.030 02/18/2010 09:20 PM    pH (UA) 5.5 03/11/2017 06:00 AM    Protein NEGATIVE  03/11/2017 06:00 AM    Glucose NEGATIVE  03/11/2017 06:00 AM    Ketone NEGATIVE  03/11/2017 06:00 AM    Bilirubin NEGATIVE  03/11/2017 06:00 AM    Urobilinogen 0.2 03/11/2017 06:00 AM    Nitrites NEGATIVE  03/11/2017 06:00 AM    Leukocyte Esterase NEGATIVE  03/11/2017 06:00 AM    Epithelial cells FEW 03/11/2017 06:00 AM    Bacteria NEGATIVE  03/11/2017 06:00 AM    WBC 0-4 03/11/2017 06:00 AM    RBC 0-5 03/11/2017 06:00 AM         Medications Reviewed:     Current Facility-Administered Medications   Medication Dose Route Frequency    sodium chloride (NS) flush 5-10 mL  5-10 mL IntraVENous Q8H    sodium chloride (NS) flush 5-10 mL  5-10 mL IntraVENous PRN    glucose chewable tablet 16 g  4 Tab Oral PRN    dextrose (D50W) injection syrg 12.5-25 g  12.5-25 g IntraVENous PRN    glucagon (GLUCAGEN) injection 1 mg  1 mg IntraMUSCular PRN    insulin lispro (HUMALOG) injection   SubCUTAneous AC&HS    niCARdipine (CARDENE) 25 mg in 0.9% sodium chloride 250 mL infusion  0-15 mg/hr IntraVENous TITRATE     ______________________________________________________________________  EXPECTED LENGTH OF STAY: - - -                 Kelly Wylie MD

## 2017-03-11 NOTE — ED NOTES
CONSULT NOTE:  9:05 PM Timoteo Kennedy MD spoke with Dr. Bladimir Ac, Consult for Neurosurgery. Discussed available diagnostic tests and clinical findings. He is in agreement with care plans as outlined.   Dr. Bladimir Ac recommends admission to the hospital.

## 2017-03-11 NOTE — ROUTINE PROCESS
TRANSFER - OUT REPORT:    Verbal report given to Elba(name) on Luis Daniel Martinez  being transferred to CCU(unit) for routine progression of care       Report consisted of patients Situation, Background, Assessment and   Recommendations(SBAR). Information from the following report(s) SBAR, ED Summary, Intake/Output, MAR and Recent Results was reviewed with the receiving nurse. Lines:   Peripheral IV 03/10/17 Right Antecubital (Active)   Site Assessment Clean, dry, & intact 3/10/2017  2:17 PM   Phlebitis Assessment 0 3/10/2017  2:17 PM   Infiltration Assessment 0 3/10/2017  2:17 PM   Dressing Status Clean, dry, & intact 3/10/2017  2:17 PM   Dressing Type Transparent 3/10/2017  2:17 PM   Hub Color/Line Status Pink;Capped;Flushed;Patent 3/10/2017  2:17 PM   Action Taken Blood drawn 3/10/2017  2:17 PM   Alcohol Cap Used Yes 3/10/2017  2:17 PM       Peripheral IV 03/10/17 Left Hand (Active)   Site Assessment Clean, dry, & intact 3/10/2017  9:43 PM   Phlebitis Assessment 0 3/10/2017  9:43 PM   Infiltration Assessment 0 3/10/2017  9:43 PM   Dressing Status Clean, dry, & intact 3/10/2017  9:43 PM   Dressing Type Transparent 3/10/2017  9:43 PM   Hub Color/Line Status Pink 3/10/2017  9:43 PM        Opportunity for questions and clarification was provided.       Patient transported with:   Monitor  O2 @ BIPAP and resp therapist liters  Registered Nurse

## 2017-03-11 NOTE — H&P
[address] [place] [place] [address]   HISTORY AND PHYSICAL       Name:  Chiqui Aguilar   MR#:  [id number]   :  1970   Account #:  [de-identified]        Date of Adm:  03/10/2017       CHIEF COMPLAINT: The patient does not provide. HISTORY OF PRESENT ILLNESS: A 64-year-old white male with past   medical history of type 2 diabetes mellitus, hypertension and morbid   obesity, who presented to the emergency department from home with   chief complaint of shortness of breath, dizziness and headache. At   current, the patient is very somnolent, difficult to arouse, and a poor   historian. As such, majority of history has been obtained from review of   ED medical report. Per the ED report, the patient had the   aforementioned complaints with worsening of dizziness, blurred vision,   generalized headache, ringing in his ears, \"body jerking,\" tremors,   along with shortness of breath, dyspnea on exertion, and a chronic   cough, all worsened today. He notably had swelling in bilateral legs,   which is more pronounced with standing. He reportedly works as a   cook. Initial recorded vital signs were blood pressure 123/121, heart   rate 90, respiratory rate 20, O2 saturation 80% on room air. He had   elevated D-dimer of 0.91. Unfortunately, the patient has been placed   on BiPAP, so he was not able to obtain a nuclear medicine VQ scan. CT of the head without contrast showed right cavernous sinus mass,   for which MRI was recommended in order to evaluate for possible sella   lesions. The patient is now seen for admission to the hospitalist service   for continued evaluation and treatment. Per the ED, Neurosurgery   consultation was already placed. Reportedly, MRI was also performed   as the patient was on BiPAP and could not undergo the study. PAST MEDICAL HISTORY   1. Type 2 diabetes mellitus. 2. Morbid obesity. 3. Hypertension.     PAST SURGICAL HISTORY: None reported. MEDICATIONS   1. Losartan 100 mg p.o. daily. 2. Metformin 1000 mg p.o. b.i.d. ALLERGIES: NO KNOWN DRUG ALLERGIES IN THE FORM   OF IODINE. SOCIAL HISTORY: Positive for smoking cigarettes, 1 pack a day. Positive for occasional alcohol, unknown as regards the alcohol use. FAMILY HISTORY: Unknown and unable to obtain from the patient   who is somnolent. REVIEW OF SYSTEMS: Unable to obtain review of systems as the   patient is somnolent. PHYSICAL EXAMINATION   VITAL SIGNS: Temperature is 98.7 degrees Fahrenheit, blood   pressure 198/122, heart rate 82, respiratory rate of 22, O2 saturation   91% on BiPAP, FIO2 of 50%, IPAP of 14, EPAP of 5, rate of 12. Recorded weight 354 pounds (155.5 kg). Reported height of 6 feet 3   inches tall. GENERAL: Ill-appearing male in acute respiratory distress. PSYCHIATRIC: The patient was somnolent, difficult to arouse. Aroused only with sternal and shoulder rub, awakens confused, with   incomprehensible speech. NEUROLOGIC: GCS of 9 (E2 V2 M5), generalized weakness. Slurred   and dysarthric, incompressible speech. Generalized weakness. Moves   extremities x4. Sensation grossly decreased in plantar surfaces of both   feet. HEENT: Normocephalic, atraumatic. Pupils are 2 mm, reactive. Sclerae are anicteric. Conjunctivae clear. Nares are patent. Oropharynx clear. Tongue is midline, nonedematous. Oral mucosa is   dry. NECK: Supple, without lymphadenopathy, JVD, carotid bruits,   or thyromegaly. LYMPH: Negative for cervical or supraclavicular adenopathy. RESPIRATORY: Lungs with scattered rhonchi. CARDIOVASCULAR: Heart is regular in rate and rhythm, normal S1   and S2, without murmurs, rubs or gallops. GASTROINTESTINAL: Abdomen is obese, soft, nontender,   nondistended, with normoactive bowel sounds. No rebound, guarding   or rigidity. No auscultated abdominal bruits. No pulsatile mass. MUSCULOSKELETAL: No acute palpable bony deformity. Negative for   calf tenderness. VASCULAR: 2+ radial and 1+ dorsalis pedis pulses, without cyanosis. Positive clubbing. He has massive bilateral lower extremity nonpitting   edema, with chronic venous stasis, erythema and discoloration of both   legs. SKIN: Warm and dry with exam as noted. LABS: Sodium 139, potassium 4.6, chloride 99, CO2 37, BUN 15,   creatinine 1.03, glucose 156, anion gap 3, calcium is 8.3, GFR greater   than 60, total bilirubin 0.6, total protein 7.2, albumin is 3.5, ALT of 31,   AST 20, alkaline phosphatase 80. Troponin of 0.05. ProBNP is 788. WBC of 10.1, hemoglobin 15.3, hematocrit 54.2, and platelets 919,   neutrophils 80%. D-dimer 0.91. ABG: pH 7.280, pCO2 of 77.3, pO2 of   71, bicarbonate 36.3, base excess 10, SpO2 of 91%. Repeat ABG: pH   7.236, pCO2 of 87.3, pO2 of 90, bicarbonate 37.1, base excess of   10, SaO2 of 94%, FIO2 of 50%, BiPAP. Chest x-ray, PA and   lateral, under-penetrated x-ray with left lower lobe opacity, likely   breast attenuation. A 12-lead EKG with undetermined rhythm, ST   changes in anterior septal leads, at 84 beats a minute. Repeat 12-lead   EKG shows sinus rhythm, with marked sinus arrhythmia, left anterior   fascicular block, in the anterior septal leads, at 57 beats a minute. IMPRESSION AND PLAN   1. Acute hypoxic hypercapnic respiratory failure. Admit the patient to   the ICU. The patient is continued on BiPAP. Repeat ABG after   adjusting ventilator settings. Consult with pulmonologist. Increase the   respiratory rate on BiPAP. Continue pulse oximetry monitoring. 2. Altered mental status. Concern for CO2 retention. Reduce the FIO2   and adjust the BiPAP for the same. Place on neurovascular checks. CT of the head without contrast also noted. 3. Right cavernous sinus mass, possible sella lesion. As   mentioned, unable to obtain MRI of the brain tonight for further   evaluation of the sella. Consult with neurosurgeon for further   evaluation. 4. Elevated brain natriuretic peptide, possible congestive heart failure. Order 2D echocardiograms. Order Lasix. Place on strict I's and O's,   and daily weights. 5. Generalized weakness and debility. Place on fall precautions. Neurovascular checks. 6. Type 2 diabetes mellitus. Place on Humalog insulin correctional   coverage. Scheduled Accu-Cheks. 7. Elevated troponin. Repeat troponin levels, monitor closely. 8. Malignant hypertension. Place on Cardene IV infusion, titrate to   keep systolic blood pressure less than 140.   9. Headache. Provide supportive care. 10. Dizziness. Place on fall precautions. 11. Venous thromboembolism prophylaxis. 12. Chronic bilateral lower extremity edema. Order Dopplers of bilateral   lower extremities. 13. Tobacco abuse. Encourage smoking cessation. MARIA LUISA Walton MD      MP / FS   D:  03/11/2017   02:24   T:  03/11/2017   04:10   Job #:  [id number]

## 2017-03-12 LAB
ALBUMIN SERPL BCP-MCNC: 2.9 G/DL (ref 3.5–5)
ALBUMIN/GLOB SERPL: 0.9 {RATIO} (ref 1.1–2.2)
ALP SERPL-CCNC: 66 U/L (ref 45–117)
ALT SERPL-CCNC: 23 U/L (ref 12–78)
ANION GAP BLD CALC-SCNC: 3 MMOL/L (ref 5–15)
ARTERIAL PATENCY WRIST A: ABNORMAL
AST SERPL W P-5'-P-CCNC: 9 U/L (ref 15–37)
BASE EXCESS BLD CALC-SCNC: 10 MMOL/L
BASOPHILS # BLD AUTO: 0 K/UL (ref 0–0.1)
BASOPHILS # BLD: 0 % (ref 0–1)
BDY SITE: ABNORMAL
BILIRUB SERPL-MCNC: 0.6 MG/DL (ref 0.2–1)
BNP SERPL-MCNC: 24 PG/ML (ref 0–100)
BUN SERPL-MCNC: 20 MG/DL (ref 6–20)
BUN/CREAT SERPL: 22 (ref 12–20)
CALCIUM SERPL-MCNC: 8.4 MG/DL (ref 8.5–10.1)
CHLORIDE SERPL-SCNC: 98 MMOL/L (ref 97–108)
CO2 SERPL-SCNC: 35 MMOL/L (ref 21–32)
CREAT SERPL-MCNC: 0.92 MG/DL (ref 0.7–1.3)
EOSINOPHIL # BLD: 0.1 K/UL (ref 0–0.4)
EOSINOPHIL NFR BLD: 2 % (ref 0–7)
ERYTHROCYTE [DISTWIDTH] IN BLOOD BY AUTOMATED COUNT: 14.5 % (ref 11.5–14.5)
GAS FLOW.O2 O2 DELIVERY SYS: ABNORMAL L/MIN
GAS FLOW.O2 SETTING OXYMISER: 4 L/M
GLOBULIN SER CALC-MCNC: 3.4 G/DL (ref 2–4)
GLUCOSE BLD STRIP.AUTO-MCNC: 139 MG/DL (ref 65–100)
GLUCOSE BLD STRIP.AUTO-MCNC: 229 MG/DL (ref 65–100)
GLUCOSE BLD STRIP.AUTO-MCNC: 93 MG/DL (ref 65–100)
GLUCOSE BLD STRIP.AUTO-MCNC: 96 MG/DL (ref 65–100)
GLUCOSE SERPL-MCNC: 119 MG/DL (ref 65–100)
HCO3 BLD-SCNC: 36.5 MMOL/L (ref 22–26)
HCT VFR BLD AUTO: 52.3 % (ref 36.6–50.3)
HGB BLD-MCNC: 15.7 G/DL (ref 12.1–17)
LYMPHOCYTES # BLD AUTO: 21 % (ref 12–49)
LYMPHOCYTES # BLD: 1.5 K/UL (ref 0.8–3.5)
MAGNESIUM SERPL-MCNC: 2.1 MG/DL (ref 1.6–2.4)
MCH RBC QN AUTO: 29.8 PG (ref 26–34)
MCHC RBC AUTO-ENTMCNC: 30 G/DL (ref 30–36.5)
MCV RBC AUTO: 99.2 FL (ref 80–99)
MONOCYTES # BLD: 0.6 K/UL (ref 0–1)
MONOCYTES NFR BLD AUTO: 9 % (ref 5–13)
NEUTS SEG # BLD: 4.7 K/UL (ref 1.8–8)
NEUTS SEG NFR BLD AUTO: 68 % (ref 32–75)
PCO2 BLD: 71.5 MMHG (ref 35–45)
PH BLD: 7.32 [PH] (ref 7.35–7.45)
PHOSPHATE SERPL-MCNC: 3.5 MG/DL (ref 2.6–4.7)
PLATELET # BLD AUTO: 173 K/UL (ref 150–400)
PO2 BLD: 54 MMHG (ref 80–100)
POTASSIUM SERPL-SCNC: 3.9 MMOL/L (ref 3.5–5.1)
PROT SERPL-MCNC: 6.3 G/DL (ref 6.4–8.2)
RBC # BLD AUTO: 5.27 M/UL (ref 4.1–5.7)
SAO2 % BLD: 83 % (ref 92–97)
SERVICE CMNT-IMP: ABNORMAL
SERVICE CMNT-IMP: ABNORMAL
SERVICE CMNT-IMP: NORMAL
SERVICE CMNT-IMP: NORMAL
SODIUM SERPL-SCNC: 136 MMOL/L (ref 136–145)
SPECIMEN TYPE: ABNORMAL
TROPONIN I SERPL-MCNC: <0.04 NG/ML
WBC # BLD AUTO: 7 K/UL (ref 4.1–11.1)

## 2017-03-12 PROCEDURE — 65660000001 HC RM ICU INTERMED STEPDOWN

## 2017-03-12 PROCEDURE — 83735 ASSAY OF MAGNESIUM: CPT | Performed by: INTERNAL MEDICINE

## 2017-03-12 PROCEDURE — 94003 VENT MGMT INPAT SUBQ DAY: CPT

## 2017-03-12 PROCEDURE — 77010033678 HC OXYGEN DAILY

## 2017-03-12 PROCEDURE — 74011250637 HC RX REV CODE- 250/637: Performed by: INTERNAL MEDICINE

## 2017-03-12 PROCEDURE — 94660 CPAP INITIATION&MGMT: CPT

## 2017-03-12 PROCEDURE — 74011250636 HC RX REV CODE- 250/636: Performed by: HOSPITALIST

## 2017-03-12 PROCEDURE — 74011250637 HC RX REV CODE- 250/637: Performed by: HOSPITALIST

## 2017-03-12 PROCEDURE — 94640 AIRWAY INHALATION TREATMENT: CPT

## 2017-03-12 PROCEDURE — 85025 COMPLETE CBC W/AUTO DIFF WBC: CPT | Performed by: INTERNAL MEDICINE

## 2017-03-12 PROCEDURE — 84100 ASSAY OF PHOSPHORUS: CPT | Performed by: INTERNAL MEDICINE

## 2017-03-12 PROCEDURE — 80053 COMPREHEN METABOLIC PANEL: CPT | Performed by: INTERNAL MEDICINE

## 2017-03-12 PROCEDURE — 82803 BLOOD GASES ANY COMBINATION: CPT

## 2017-03-12 PROCEDURE — 74011000250 HC RX REV CODE- 250: Performed by: INTERNAL MEDICINE

## 2017-03-12 PROCEDURE — 83880 ASSAY OF NATRIURETIC PEPTIDE: CPT | Performed by: INTERNAL MEDICINE

## 2017-03-12 PROCEDURE — 77030027138 HC INCENT SPIROMETER -A

## 2017-03-12 PROCEDURE — 82962 GLUCOSE BLOOD TEST: CPT

## 2017-03-12 PROCEDURE — 36600 WITHDRAWAL OF ARTERIAL BLOOD: CPT

## 2017-03-12 PROCEDURE — 36415 COLL VENOUS BLD VENIPUNCTURE: CPT | Performed by: FAMILY MEDICINE

## 2017-03-12 PROCEDURE — 84484 ASSAY OF TROPONIN QUANT: CPT | Performed by: FAMILY MEDICINE

## 2017-03-12 PROCEDURE — 74011250636 HC RX REV CODE- 250/636: Performed by: FAMILY MEDICINE

## 2017-03-12 RX ORDER — ENOXAPARIN SODIUM 100 MG/ML
1 INJECTION SUBCUTANEOUS EVERY 12 HOURS
Status: DISCONTINUED | OUTPATIENT
Start: 2017-03-13 | End: 2017-03-13

## 2017-03-12 RX ORDER — BUMETANIDE 0.25 MG/ML
1 INJECTION INTRAMUSCULAR; INTRAVENOUS ONCE
Status: COMPLETED | OUTPATIENT
Start: 2017-03-12 | End: 2017-03-12

## 2017-03-12 RX ORDER — IBUPROFEN 200 MG
1 TABLET ORAL DAILY
Status: DISCONTINUED | OUTPATIENT
Start: 2017-03-12 | End: 2017-03-17 | Stop reason: HOSPADM

## 2017-03-12 RX ADMIN — BUMETANIDE 1 MG: 0.25 INJECTION, SOLUTION INTRAMUSCULAR; INTRAVENOUS at 14:55

## 2017-03-12 RX ADMIN — FAMOTIDINE 20 MG: 20 TABLET ORAL at 17:17

## 2017-03-12 RX ADMIN — Medication 10 ML: at 14:56

## 2017-03-12 RX ADMIN — BUDESONIDE 500 MCG: 0.5 INHALANT RESPIRATORY (INHALATION) at 20:08

## 2017-03-12 RX ADMIN — Medication 10 ML: at 22:00

## 2017-03-12 RX ADMIN — Medication 10 ML: at 08:10

## 2017-03-12 RX ADMIN — IPRATROPIUM BROMIDE AND ALBUTEROL SULFATE 3 ML: .5; 3 SOLUTION RESPIRATORY (INHALATION) at 10:09

## 2017-03-12 RX ADMIN — INSULIN LISPRO 3 UNITS: 100 INJECTION, SOLUTION INTRAVENOUS; SUBCUTANEOUS at 12:14

## 2017-03-12 RX ADMIN — ENOXAPARIN SODIUM 160 MG: 80 INJECTION SUBCUTANEOUS at 23:16

## 2017-03-12 RX ADMIN — BUDESONIDE 500 MCG: 0.5 INHALANT RESPIRATORY (INHALATION) at 10:09

## 2017-03-12 RX ADMIN — IPRATROPIUM BROMIDE AND ALBUTEROL SULFATE 3 ML: .5; 3 SOLUTION RESPIRATORY (INHALATION) at 20:08

## 2017-03-12 RX ADMIN — ENOXAPARIN SODIUM 160 MG: 80 INJECTION SUBCUTANEOUS at 12:33

## 2017-03-12 RX ADMIN — IPRATROPIUM BROMIDE AND ALBUTEROL SULFATE 3 ML: .5; 3 SOLUTION RESPIRATORY (INHALATION) at 14:54

## 2017-03-12 RX ADMIN — FAMOTIDINE 20 MG: 20 TABLET ORAL at 08:36

## 2017-03-12 RX ADMIN — IPRATROPIUM BROMIDE AND ALBUTEROL SULFATE 3 ML: .5; 3 SOLUTION RESPIRATORY (INHALATION) at 03:34

## 2017-03-12 NOTE — PROGRESS NOTES
0730: Report received from Da Johnson RN.     0900: Pt O2 saturations 85-88%, encouraged pt to use BIPAP. Pt refused for now. Will continue to encourage and assist with incentive spirometer. 1100: Orders received from hospitalist, Dr. Paulina Mtz. 1200: Dr. Zia Rodriguez, pulmonology, at bedside. Orders received for 1 mg Bumex, pt diuresed 1200 mL total (at the end of shift). 1930: Bedside shift change report given to Da Johnson RN (oncoming nurse) by Marjorie Torres RN (offgoing nurse). Report included the following information SBAR, Kardex, ED Summary, Procedure Summary, Intake/Output, MAR, Recent Results, Med Rec Status and Cardiac Rhythm NSR.

## 2017-03-12 NOTE — CONSULTS
PULMONARY/CRITICAL CARE/SLEEP MEDICINE    Initial Physician Consultation Note    Name: Carlo Shane   : 1970   MRN: [id number]   Date: 3/11/2017      Subjective:   Consult Note: 3/11/2017     This patient has been seen and evaluated at the request of Dr. Bee Montes De Oca for resp failure. Medical records and data reviewed. Patient is a 56 y.o. male who presented to the hospital with complaints of headache, increased sleepiness, leg swelling and SOB. Investigations done showed abnormal head CT and gas exchange. When seen earlier today, he was awake and alert. He reports he has been told of a diagnosis of sleep apnea but could not tolerate NIPPV. 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 Echo done today shows evidence of diastolic dysfunction with LVH and LAE.       Assessment:     Acute on chronic hypercarbic resp failure- multifactorial  MAXX/OHS- untreated  Chronic tobacco use, at risk for COPD  Chronic HFpEF with LV diastolic dysfunction  Secondary PH relate to above  Low clinical suspicion got VTE  Abnormal head CT read as cavernous sinus mass  Uncontrolled HTN    Recommendations:     NIPPV- will need Trilogy/OP bipap titration  OP PFTs  Bronchodilators  Diuretics as tolerated  Pepcid  On lovenox  BP management per hospitalist  Neurosurgery consulted  D/W RN        Active Problem List:     Problem List  Date Reviewed: 3/10/2017          Codes Class    * (Principal)Acute respiratory failure with hypoxia and hypercapnia (HCC) ICD-10-CM: J96.01, J96.02  ICD-9-CM: 518.81         MAXX (obstructive sleep apnea) ICD-10-CM: G47.33  ICD-9-CM: 327.23         Dyslipidemia (high LDL; low HDL) ICD-10-CM: E78.4  ICD-9-CM: 272.4         Obesity, morbid (more than 100 lbs over ideal weight or BMI > 40) (HCC) ICD-10-CM: E66.01  ICD-9-CM: 278.01         Hyperosmolarity due to secondary diabetes ([facility] [address] ICD-10-CM: E13.00  ICD-9-CM: 249.20         Chest pain, unspecified ICD-10-CM: R07.9  ICD-9-CM: 786.50         Benign essential hypertension ICD-10-CM: I10  ICD-9-CM: 401.1               Past Medical History:      has a past medical history of Diabetes ([address] and Hypertension. Past Surgical History:      has no past surgical history on file. Home Medications:     Prior to Admission medications    Medication Sig Start Date End Date Taking? Authorizing Provider   metFORMIN (GLUCOPHAGE) 1,000 mg tablet Take 1 Tab by mouth two (2) times daily (with meals). 16  Yes Josey Pandya MD   losartan (COZAAR) 100 mg tablet TAKE 1 TABLET BY MOUTH EVERY DAY(NON PAYMENT OF INS) 3/8/16  Yes Josey Pandya MD       Allergies/Social/Family History: Allergies   Allergen Reactions    Iodine Hives      Social History   Substance Use Topics    Smoking status: Current Every Day Smoker     Packs/day: 1.00    Smokeless tobacco: Never Used    Alcohol use No      Comment: occ      History reviewed. No pertinent family history. Review of Systems:     Pertinent items are noted in HPI. Objective:   Vital Signs:  Visit Vitals    /60    Pulse 70    Temp 99.1 °F (37.3 °C)    Resp 17    Ht 6' (1.829 m)    Wt (!) 165.5 kg (364 lb 13.8 oz)    SpO2 90%    BMI 49.48 kg/m2    O2 Flow Rate (L/min): 4 l/min O2 Device: Nasal cannula Temp (24hrs), Av.2 °F (36.8 °C), Min:97.6 °F (36.4 °C), Max:99.1 °F (37.3 °C)           Intake/Output:     Intake/Output Summary (Last 24 hours) at 17 1904  Last data filed at 17 1200   Gross per 24 hour   Intake              480 ml   Output             1350 ml   Net             -870 ml       Physical Exam:   General:  Alert, cooperative, no distress, appears stated age. Head:  Normocephalic, without obvious abnormality, atraumatic. Eyes:  Conjunctivae/corneas clear. PERRL, EOMs intact.    Neck: Supple, symmetrical, trachea midline, no adenopathy, thyroid: no enlargment/tenderness/nodules, no carotid bruit and no JVD. Increased neck circumference   Lungs:   Diminished BS at bases   Chest wall:  No tenderness or deformity. Heart:  Regular rate and rhythm, S1, S2 normal, no murmur, click, rub or gallop. Abdomen:   Soft, non-tender. Bowel sounds normal. No masses,  No organomegaly. Extremities: Extremities normal, atraumatic, no cyanosis, ++edema. Pulses: 2+ and symmetric all extremities. Skin: Skin color, texture, turgor normal. No rashes or lesions   Neurologic: Grossly nonfocal         LABS AND  DATA: Personally reviewed  Recent Labs      03/11/17   0345  03/10/17   1401   WBC  8.3  10.1   HGB  14.9  16.3   HCT  49.5  54.2*   PLT  168  183     Recent Labs      03/11/17   0345  03/10/17   1401   NA  139  139   K  4.0  4.6   CL  101  99   CO2  36*  37*   BUN  17  16   CREA  0.83  1.03   GLU  100  156*   CA  8.0*  8.3*     Recent Labs      03/10/17   1401   SGOT  28   AP  80   TP  7.2   ALB  3.5   GLOB  3.7     No results for input(s): INR, PTP, APTT in the last 72 hours. No lab exists for component: INREXT   Recent Labs      03/11/17   1024  03/11/17   0436   PHI  7.281*  7.237*   PCO2I  83.3*  85.6*   PO2I  76*  76*   FIO2I  50  50     Recent Labs      03/11/17   1212  03/11/17   0345   TROIQ  <0.04  0.04       MEDS: Reviewed    Chest X-Ray: personally reviewed and report checked    EKG/ Tele      Thank you for allowing me to participate in this patient's care.     MD Judy Suarez MD, [facility]  [facility]

## 2017-03-12 NOTE — PROGRESS NOTES
Hospitalist Progress Note  Sloane Luna MD  Office: [phone]        Date of Service:  3/12/2017  NAME:  Markie Ross  :  1970  MRN:  [id number]      Admission Summary:   43-year-old white male with past medical history of type 2 diabetes mellitus, hypertension and morbid   obesity, who presented to theED with complaint of shortness of breath, dizziness,headache,blurred vision,  ringing in his ears,and \"body jerking,\" tremors. Initial recorded vital signs were blood pressure 123/121, heart   rate 90, respiratory rate 20, O2 saturation 80% on room air. He had elevated D-dimer of 0.91. The patient was placed on BiPAP in ED, so he was not able to obtain a nuclear medicine VQ scan or MRI brain in ED. CT of the head without contrast showed right cavernous sinus mass, for which MRI was recommended in order to evaluate for possible sella lesions. Interval history / Subjective:   Doing ok. remains on bipap. Pt removed bipap this am and has been off for approx 2 hrs to NC. During eval of pt O2 sat range 86-92%. D/w pt need for bipap and cont co2 retention per abg. Pt agreeable to having it placed back on     Assessment & Plan:     Acute hypoxic hypercapnic respiratory failure. -sec to underlying untreated anjana, tobacco abuse  -continued on BiPAP, wean as romeo  -pulm recs apprec  -attempt to obtain vq scan or cta when able to wean off bipap  -started full dose lovenox to empirically treat until able to r/o pe     Altered mental status.   -suspect co2 narcosis. On bipap  -neurovascular checks. -rechk abg this am, wean bipap as romeo  -CT of the head without contrast Right cavernous sinus mass, possible sella lesion.      Right cavernous sinus mass  -noted as incidental finding on ct head as above  -pend MRI brain to r/o sella lesion once able to wean off bipap  -neurosx c/s on admit    Elevated BNP (788)  -cxr no effusions, no pulm edema  -repeat bnp today normal at 24  -pend echocardiograms.   -Lasix ordered on admit,echo ok. No need for further diuresis  -will f/u cta if able to obtain vs vq scan      Generalized weakness and debility, Dizziness  -fall precautions.   -consider c/s PT when able to wean off bipap  -f/u pend imagine studies    Type 2 diabetes mellitus., HbA1c 7.4  -SSI  -cont hold metformin    Mildly Elevated troponin.   -in setting of resp distress. Initial trop 0.05 indeterminate range and repeat 0.04 and 0.04 normal. No indic of mi  -d/c serial trop    Malignant hypertension, much improved  -Cardene IV infusion titrated off on arrival to floor  -cont cozaar    Chronic bilateral lower extremity edema.   -Dopplers BLE neg for dvt    Tobacco abuse.   -Encourage smoking cessation.     Code status: full  DVT prophylaxis full dose lovenox    Care Plan discussed with: Patient/Family and Nurse  Disposition: TBD     Hospital Problems  Date Reviewed: 3/10/2017          Codes Class Noted POA    * (Principal)Acute respiratory failure with hypoxia and hypercapnia (HCC) ICD-10-CM: J96.01, J96.02  ICD-9-CM: 518.81  3/10/2017 Unknown                Review of Systems:   A comprehensive review of systems was negative except for that written in the HPI. Vital Signs:    Last 24hrs VS reviewed since prior progress note. Most recent are:  Visit Vitals    /75    Pulse 70    Temp 97.9 °F (36.6 °C)    Resp 16    Ht 6' (1.829 m)    Wt (!) 164.4 kg (362 lb 7 oz)    SpO2 98%    BMI 49.16 kg/m2         Intake/Output Summary (Last 24 hours) at 03/12/17 0837  Last data filed at 03/11/17 2213   Gross per 24 hour   Intake              240 ml   Output             1000 ml   Net             -760 ml        Physical Examination:             Constitutional:  No acute distress, cooperative, pleasant    ENT:  Oral mucous moist, oropharynx benign. Neck supple,    Resp:  CTA bilaterally. No wheezing/rhonchi/rales.  No accessory muscle use   CV: Regular rhythm, normal rate, no murmurs, gallops, rubs    GI:  Soft, non distended, non tender. normoactive bowel sounds, no hepatosplenomegaly     Musculoskeletal:  No edema, warm, 2+ pulses throughout    Neurologic:  Moves all extremities. AAOx3, CN II-XII reviewed            Data Review:    Review and/or order of clinical lab test      Labs:     Recent Labs      03/12/17 0325 03/11/17   0345   WBC  7.0  8.3   HGB  15.7  14.9   HCT  52.3*  49.5   PLT  173  168     Recent Labs      03/12/17 0325 03/11/17   0345  03/10/17   1401   NA  136  139  139   K  3.9  4.0  4.6   CL  98  101  99   CO2  35*  36*  37*   BUN  20  17  16   CREA  0.92  0.83  1.03   GLU  119*  100  156*   CA  8.4*  8.0*  8.3*   MG  2.1   --    --    PHOS  3.5   --    --      Recent Labs      03/12/17   0325  03/10/17   1401   SGOT  9*  28   ALT  23  31   AP  66  80   TBILI  0.6  0.6   TP  6.3*  7.2   ALB  2.9*  3.5   GLOB  3.4  3.7     No results for input(s): INR, PTP, APTT in the last 72 hours. No lab exists for component: INREXT, INREXT   No results for input(s): FE, TIBC, PSAT, FERR in the last 72 hours. No results found for: FOL, RBCF   No results for input(s): PH, PCO2, PO2 in the last 72 hours.   Recent Labs      03/12/17 0325 03/11/17   1212  03/11/17   0345   TROIQ  <0.04  <0.04  0.04     Lab Results   Component Value Date/Time    Cholesterol, total 182 10/22/2011 04:40 AM    HDL Cholesterol 26 10/22/2011 04:40 AM    LDL, calculated 79.8 10/22/2011 04:40 AM    Triglyceride 381 10/22/2011 04:40 AM    CHOL/HDL Ratio 7.0 10/22/2011 04:40 AM     Lab Results   Component Value Date/Time    Glucose (POC) 96 03/12/2017 08:09 AM    Glucose (POC) 120 03/11/2017 09:35 PM    Glucose (POC) 97 03/11/2017 05:22 PM    Glucose (POC) 83 03/11/2017 11:52 AM    Glucose (POC) 95 03/11/2017 06:25 AM     Lab Results   Component Value Date/Time    Color YELLOW/STRAW 03/11/2017 06:00 AM    Appearance CLEAR 03/11/2017 06:00 AM    Specific gravity 1.009 03/11/2017 06:00 AM    Specific gravity >1.030 02/18/2010 09:20 PM    pH (UA) 5.5 03/11/2017 06:00 AM    Protein NEGATIVE  03/11/2017 06:00 AM    Glucose NEGATIVE  03/11/2017 06:00 AM    Ketone NEGATIVE  03/11/2017 06:00 AM    Bilirubin NEGATIVE  03/11/2017 06:00 AM    Urobilinogen 0.2 03/11/2017 06:00 AM    Nitrites NEGATIVE  03/11/2017 06:00 AM    Leukocyte Esterase NEGATIVE  03/11/2017 06:00 AM    Epithelial cells FEW 03/11/2017 06:00 AM    Bacteria NEGATIVE  03/11/2017 06:00 AM    WBC 0-4 03/11/2017 06:00 AM    RBC 0-5 03/11/2017 06:00 AM         Medications Reviewed:     Current Facility-Administered Medications   Medication Dose Route Frequency    enoxaparin (LOVENOX) injection 160 mg  1 mg/kg SubCUTAneous Q24H    albuterol-ipratropium (DUO-NEB) 2.5 MG-0.5 MG/3 ML  3 mL Nebulization Q6H RT    budesonide (PULMICORT) 500 mcg/2 ml nebulizer suspension  500 mcg Nebulization BID RT    famotidine (PEPCID) tablet 20 mg  20 mg Oral BID    losartan (COZAAR) tablet 100 mg  100 mg Oral DAILY    sodium chloride (NS) flush 5-10 mL  5-10 mL IntraVENous Q8H    sodium chloride (NS) flush 5-10 mL  5-10 mL IntraVENous PRN    glucose chewable tablet 16 g  4 Tab Oral PRN    dextrose (D50W) injection syrg 12.5-25 g  12.5-25 g IntraVENous PRN    glucagon (GLUCAGEN) injection 1 mg  1 mg IntraMUSCular PRN    insulin lispro (HUMALOG) injection   SubCUTAneous AC&HS    niCARdipine (CARDENE) 25 mg in 0.9% sodium chloride 250 mL infusion  0-15 mg/hr IntraVENous TITRATE     ______________________________________________________________________  EXPECTED LENGTH OF STAY: - - -                 Carlos Alberto Xiao MD

## 2017-03-12 NOTE — PROGRESS NOTES
PULMONARY/CRITICAL CARE/SLEEP MEDICINE    Initial Physician Consultation Note    Name: Catherine Beck   : 1970   MRN: [id number]   Date: 3/12/2017      Subjective:     3/12  Seen and examined earlier today. Awake and conversant despite bipap    Consult Note: 3/11    This patient has been seen and evaluated at the request of Dr. Danica Gooden for resp failure. Medical records and data reviewed. Patient is a 56 y.o. male who presented to the hospital with complaints of headache, increased sleepiness, leg swelling and SOB. Investigations done showed abnormal head CT and gas exchange. When seen earlier today, he was awake and alert. He reports he has been told of a diagnosis of sleep apnea but could not tolerate NIPPV. 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 Echo done today shows evidence of diastolic dysfunction with LVH and LAE.       Assessment:     Acute on chronic hypercarbic resp failure- multifactorial  MAXX/OHS- untreated  Chronic tobacco use, at risk for COPD  Chronic HFpEF with LV diastolic dysfunction  Secondary PH related to above  Low clinical suspicion got VTE  Abnormal head CT read as cavernous sinus mass  Uncontrolled HTN    Recommendations:     NIPPV- will need Trilogy/OP bipap titration  OP PFTs  Bronchodilators  Diuretics as tolerated  Pepcid  MRI   On lovenox  BP management per hospitalist  Neurosurgery consulted  D/W RN        Active Problem List:     Problem List  Date Reviewed: 3/10/2017          Codes Class    * (Principal)Acute respiratory failure with hypoxia and hypercapnia (HCC) ICD-10-CM: J96.01, J96.02  ICD-9-CM: 518.81         MAXX (obstructive sleep apnea) ICD-10-CM: G47.33  ICD-9-CM: 327.23         Dyslipidemia (high LDL; low HDL) ICD-10-CM: E78.4  ICD-9-CM: 272.4         Obesity, morbid (more than 100 lbs over ideal weight or BMI > 40) (HCC) ICD-10-CM: E66.01  ICD-9-CM: 278.01         Hyperosmolarity due to secondary diabetes ([facility] [address] ICD-10-CM: E13.00  ICD-9-CM: 249.20         Chest pain, unspecified ICD-10-CM: R07.9  ICD-9-CM: 786.50         Benign essential hypertension ICD-10-CM: I10  ICD-9-CM: 401.1               Past Medical History:      has a past medical history of Diabetes ([facility] [address] and Hypertension. Past Surgical History:      has no past surgical history on file. Home Medications:     Prior to Admission medications    Medication Sig Start Date End Date Taking? Authorizing Provider   metFORMIN (GLUCOPHAGE) 1,000 mg tablet Take 1 Tab by mouth two (2) times daily (with meals). 16  Yes Kyra Church MD   losartan (COZAAR) 100 mg tablet TAKE 1 TABLET BY MOUTH EVERY DAY(NON PAYMENT OF INS) 3/8/16  Yes Kyra Church MD       Allergies/Social/Family History: Allergies   Allergen Reactions    Iodine Hives      Social History   Substance Use Topics    Smoking status: Current Every Day Smoker     Packs/day: 1.00    Smokeless tobacco: Never Used    Alcohol use No      Comment: occ      History reviewed. No pertinent family history. Review of Systems:     Pertinent items are noted in HPI. Objective:   Vital Signs:  Visit Vitals    /52 (BP 1 Location: Left arm, BP Patient Position: At rest)    Pulse 75    Temp 97.9 °F (36.6 °C)    Resp 11    Ht 6' (1.829 m)    Wt (!) 164.4 kg (362 lb 7 oz)    SpO2 93%    BMI 49.16 kg/m2    O2 Flow Rate (L/min): 4 l/min O2 Device: Nasal cannula Temp (24hrs), Av.2 °F (36.8 °C), Min:97.6 °F (36.4 °C), Max:99.1 °F (37.3 °C)           Intake/Output:     Intake/Output Summary (Last 24 hours) at 17 1313  Last data filed at 17 2213   Gross per 24 hour   Intake              240 ml   Output              600 ml   Net             -360 ml       Physical Exam:   General:  Alert, cooperative, no distress, appears stated age. Head:  Normocephalic, without obvious abnormality, atraumatic. Eyes:  Conjunctivae/corneas clear. PERRL, EOMs intact.    Neck: Supple, symmetrical, trachea midline, no adenopathy, thyroid: no enlargment/tenderness/nodules, no carotid bruit and no JVD. Increased neck circumference   Lungs:   Diminished BS at bases   Chest wall:  No tenderness or deformity. Heart:  Regular rate and rhythm, S1, S2 normal, no murmur, click, rub or gallop. Abdomen:   Soft, non-tender. Bowel sounds normal. No masses,  No organomegaly. Extremities: Extremities normal, atraumatic, no cyanosis, ++edema. Pulses: 2+ and symmetric all extremities. Skin: Skin color, texture, turgor normal. No rashes or lesions   Neurologic: Grossly nonfocal         LABS AND  DATA: Personally reviewed  Recent Labs      03/12/17   0325  03/11/17   0345   WBC  7.0  8.3   HGB  15.7  14.9   HCT  52.3*  49.5   PLT  173  168     Recent Labs      03/12/17 0325 03/11/17   0345   NA  136  139   K  3.9  4.0   CL  98  101   CO2  35*  36*   BUN  20  17   CREA  0.92  0.83   GLU  119*  100   CA  8.4*  8.0*   MG  2.1   --    PHOS  3.5   --      Recent Labs      03/12/17   0325  03/10/17   1401   SGOT  9*  28   AP  66  80   TP  6.3*  7.2   ALB  2.9*  3.5   GLOB  3.4  3.7     No results for input(s): INR, PTP, APTT in the last 72 hours. No lab exists for component: Domingo Mercury   Recent Labs      03/12/17   0849  03/11/17   1024  03/11/17   0436   PHI  7.316*  7.281*  7.237*   PCO2I  71.5*  83.3*  85.6*   PO2I  54*  76*  76*   FIO2I   --   50  50     Recent Labs      03/12/17   0325  03/11/17   1212   TROIQ  <0.04  <0.04       MEDS: Reviewed    Chest X-Ray: personally reviewed and report checked    EKG/ Tele      Thank you for allowing me to participate in this patient's care.     MD Puja Valdes MD, [facility]  [facility]

## 2017-03-12 NOTE — INTERDISCIPLINARY ROUNDS
IDR/SLIDR Summary          Patient: Charbel Tucker MRN: [id number]    Age: 56 y.o. YOB: 1970 Room/Bed: [address] [place] [address]   Admit Diagnosis: Acute respiratory failure with hypoxia and hypercapnia (HCC)  Principal Diagnosis: Acute respiratory failure with hypoxia and hypercapnia (HCC)   Goals: Stabilize Resp, diagnostic testing  Readmission: NO  Quality Measure: PNA  VTE Prophylaxis: Mechanical  Influenza Vaccine screening completed? YES  Pneumococcal Vaccine screening completed? NO  Mobility needs: No   Nutrition plan:Yes  Consults:Respiratory    Financial concerns:***  Escalated to CM? NO  RRAT Score: 8   Interventions:{Intervention:99721}  Testing due for pt today?  YES  LOS: 2 days Expected length of stay 4 days  Discharge plan: ***   PCP: Corinna Ann MD  Transportation needs: {Yes/No Caps:74730}    Days before discharge:{Days before Discharge:21344}  Discharge disposition: {Discharge Destination:34191::\"Home\"}    Signed:     Monik Norman RN  3/12/2017  1:24 AM

## 2017-03-12 NOTE — INTERDISCIPLINARY ROUNDS
IDR/SLIDR Summary          Patient: Francisco Johnson MRN: [id number]    Age: 56 y.o. YOB: 1970 Room/Bed: [address] [place] [address]   Admit Diagnosis: Acute respiratory failure with hypoxia and hypercapnia (HCC)  Principal Diagnosis: Acute respiratory failure with hypoxia and hypercapnia (HCC)   Goals: Stabilize Resp, diagnostic testing  Readmission: NO  Quality Measure: PNA  VTE Prophylaxis: Mechanical  Influenza Vaccine screening completed? YES  Pneumococcal Vaccine screening completed? NO  Mobility needs: No   Nutrition plan:Yes  Consults:Respiratory    Financial concerns:Yes  Escalated to CM? NO  RRAT Score: 8   Interventions:Palliative Care   Testing due for pt today?  YES  LOS: 2 days Expected length of stay 4 days  Discharge plan: tbd   PCP: Britni Smart MD  Transportation needs: Yes    Days before discharge:two or more days before discharge   Discharge disposition: Home    Signed:     Bc Buchanan RN  3/12/2017  1:24 AM

## 2017-03-12 NOTE — PROGRESS NOTES
1930 Bedside shift change report given to Da Johnson (oncoming nurse) by Marjorie Torres (offgoing nurse). Report included the following information SBAR, Kardex, Procedure Summary, Intake/Output, MAR, Recent Results and Cardiac Rhythm NSR.    2000 Pt awake sitting in chair, O2 sats 93% on 4L NC.  2200 Per pt, he had some jerking as a child and thought it was Tourette's. But \"it has never been this bad before. \"    0730 Bedside shift change report given to Belkis(oncoming nurse) by Da Johnson (offgoing nurse). Report included the following information SBAR, Kardex, Procedure Summary, Intake/Output, MAR, Recent Results and Cardiac Rhythm NSR.

## 2017-03-12 NOTE — PROGRESS NOTES
2330 Bedside shift change report given to Veena Bhatia (oncoming nurse) by Dennison Schwab (offgoing nurse). Report included the following information SBAR, Kardex, Intake/Output, MAR, Recent Results and Cardiac Rhythm NSR     2345 Given cozaar and placed on bipap. The ending of Daylight Saving Time occurred at 0200 hrs. Documentation of patient care and medications administered is done with respect to the time change. 0400 Pt requiring frequent breaks from bipap.     0730 Bedside shift change report given to Fannie Dent (oncoming nurse) by Veena Bhatia (offgoing nurse). Report included the following information SBAR, Kardex, Procedure Summary, Intake/Output, MAR, Recent Results and Cardiac Rhythm NSR.

## 2017-03-13 LAB
ANION GAP BLD CALC-SCNC: 6 MMOL/L (ref 5–15)
BUN SERPL-MCNC: 16 MG/DL (ref 6–20)
BUN/CREAT SERPL: 17 (ref 12–20)
CALCIUM SERPL-MCNC: 8.3 MG/DL (ref 8.5–10.1)
CHLORIDE SERPL-SCNC: 97 MMOL/L (ref 97–108)
CO2 SERPL-SCNC: 33 MMOL/L (ref 21–32)
CREAT SERPL-MCNC: 0.92 MG/DL (ref 0.7–1.3)
GLUCOSE BLD STRIP.AUTO-MCNC: 113 MG/DL (ref 65–100)
GLUCOSE BLD STRIP.AUTO-MCNC: 113 MG/DL (ref 65–100)
GLUCOSE BLD STRIP.AUTO-MCNC: 127 MG/DL (ref 65–100)
GLUCOSE BLD STRIP.AUTO-MCNC: 174 MG/DL (ref 65–100)
GLUCOSE SERPL-MCNC: 118 MG/DL (ref 65–100)
MAGNESIUM SERPL-MCNC: 2 MG/DL (ref 1.6–2.4)
PHOSPHATE SERPL-MCNC: 4.1 MG/DL (ref 2.6–4.7)
POTASSIUM SERPL-SCNC: 4.1 MMOL/L (ref 3.5–5.1)
SERVICE CMNT-IMP: ABNORMAL
SODIUM SERPL-SCNC: 136 MMOL/L (ref 136–145)

## 2017-03-13 PROCEDURE — 74011250637 HC RX REV CODE- 250/637: Performed by: HOSPITALIST

## 2017-03-13 PROCEDURE — 94660 CPAP INITIATION&MGMT: CPT

## 2017-03-13 PROCEDURE — 74011000250 HC RX REV CODE- 250: Performed by: INTERNAL MEDICINE

## 2017-03-13 PROCEDURE — 36415 COLL VENOUS BLD VENIPUNCTURE: CPT | Performed by: INTERNAL MEDICINE

## 2017-03-13 PROCEDURE — 83735 ASSAY OF MAGNESIUM: CPT | Performed by: INTERNAL MEDICINE

## 2017-03-13 PROCEDURE — 82962 GLUCOSE BLOOD TEST: CPT

## 2017-03-13 PROCEDURE — 77010033678 HC OXYGEN DAILY

## 2017-03-13 PROCEDURE — 74011250636 HC RX REV CODE- 250/636: Performed by: HOSPITALIST

## 2017-03-13 PROCEDURE — 65660000000 HC RM CCU STEPDOWN

## 2017-03-13 PROCEDURE — 84100 ASSAY OF PHOSPHORUS: CPT | Performed by: INTERNAL MEDICINE

## 2017-03-13 PROCEDURE — 74011250637 HC RX REV CODE- 250/637: Performed by: NURSE PRACTITIONER

## 2017-03-13 PROCEDURE — 74011250637 HC RX REV CODE- 250/637: Performed by: INTERNAL MEDICINE

## 2017-03-13 PROCEDURE — 80048 BASIC METABOLIC PNL TOTAL CA: CPT | Performed by: INTERNAL MEDICINE

## 2017-03-13 PROCEDURE — 94640 AIRWAY INHALATION TREATMENT: CPT

## 2017-03-13 RX ORDER — IPRATROPIUM BROMIDE AND ALBUTEROL SULFATE 2.5; .5 MG/3ML; MG/3ML
3 SOLUTION RESPIRATORY (INHALATION)
Status: DISCONTINUED | OUTPATIENT
Start: 2017-03-13 | End: 2017-03-14

## 2017-03-13 RX ORDER — ENOXAPARIN SODIUM 100 MG/ML
40 INJECTION SUBCUTANEOUS EVERY 12 HOURS
Status: DISCONTINUED | OUTPATIENT
Start: 2017-03-14 | End: 2017-03-17 | Stop reason: HOSPADM

## 2017-03-13 RX ORDER — HYDROCHLOROTHIAZIDE 25 MG/1
25 TABLET ORAL DAILY
Status: DISCONTINUED | OUTPATIENT
Start: 2017-03-13 | End: 2017-03-17 | Stop reason: HOSPADM

## 2017-03-13 RX ADMIN — IPRATROPIUM BROMIDE AND ALBUTEROL SULFATE 3 ML: .5; 3 SOLUTION RESPIRATORY (INHALATION) at 19:34

## 2017-03-13 RX ADMIN — ENOXAPARIN SODIUM 160 MG: 80 INJECTION SUBCUTANEOUS at 17:58

## 2017-03-13 RX ADMIN — BUDESONIDE 500 MCG: 0.5 INHALANT RESPIRATORY (INHALATION) at 08:04

## 2017-03-13 RX ADMIN — IPRATROPIUM BROMIDE AND ALBUTEROL SULFATE 3 ML: .5; 3 SOLUTION RESPIRATORY (INHALATION) at 03:02

## 2017-03-13 RX ADMIN — IPRATROPIUM BROMIDE AND ALBUTEROL SULFATE 3 ML: .5; 3 SOLUTION RESPIRATORY (INHALATION) at 08:04

## 2017-03-13 RX ADMIN — LOSARTAN POTASSIUM 100 MG: 50 TABLET ORAL at 08:36

## 2017-03-13 RX ADMIN — HYDROCHLOROTHIAZIDE 25 MG: 25 TABLET ORAL at 17:58

## 2017-03-13 RX ADMIN — BUDESONIDE 500 MCG: 0.5 INHALANT RESPIRATORY (INHALATION) at 19:34

## 2017-03-13 RX ADMIN — Medication 10 ML: at 22:14

## 2017-03-13 RX ADMIN — FAMOTIDINE 20 MG: 20 TABLET ORAL at 08:36

## 2017-03-13 RX ADMIN — FAMOTIDINE 20 MG: 20 TABLET ORAL at 17:58

## 2017-03-13 RX ADMIN — IPRATROPIUM BROMIDE AND ALBUTEROL SULFATE 3 ML: .5; 3 SOLUTION RESPIRATORY (INHALATION) at 16:30

## 2017-03-13 RX ADMIN — Medication 10 ML: at 07:59

## 2017-03-13 NOTE — PROGRESS NOTES
PULMONARY/CRITICAL CARE/SLEEP MEDICINE    Initial Physician Consultation Note    Name: Maryan Castleman   : 1970   MRN: [id number]   Date: 3/13/2017      Subjective:       3/13 - Talked with him re: number one health problem is the smoking 1.5 ppd and super morbid obesity ( Body mass index is 47.93 kg/(m^2). )  Has MAXX but returned the CPAP. Didn't like it so he sent it back.  smoking cessation. Okay for IMCU      3/12  Seen and examined earlier today. Awake and conversant despite bipap    Consult Note: 3/11    This patient has been seen and evaluated at the request of Dr. Raisa Malik for resp failure. Medical records and data reviewed. Patient is a 56 y.o. male who presented to the hospital with complaints of headache, increased sleepiness, leg swelling and SOB. Investigations done showed abnormal head CT and gas exchange. When seen earlier today, he was awake and alert. He reports he has been told of a diagnosis of sleep apnea but could not tolerate NIPPV. 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 Echo done today shows evidence of diastolic dysfunction with LVH and LAE.       Assessment:     Acute on chronic hypercarbic resp failure- multifactorial  MAXX/OHS- untreated - returned CPAP In past  Chronic tobacco use, at risk for COPD  Chronic HFpEF with LV diastolic dysfunction  Secondary PH related to above  Low clinical suspicion for VTE - VQ not tenable  Abnormal head CT read as cavernous sinus mass  Uncontrolled HTN    Recommendations:     NIPPV- will need Trilogy/OP bipap titration  OP PFTs  Bronchodilators  Diuretics as tolerated  Pepcid  MRI may not be tenable As outpatient   On lovenox - can decrease   BP management per hospitalist  D/W RN        Active Problem List:     Problem List  Date Reviewed: 3/10/2017          Codes Class    * (Principal)Acute respiratory failure with hypoxia and hypercapnia (HCC) ICD-10-CM: J96.01, J96.02  ICD-9-CM: 518.81         MAXX (obstructive sleep apnea) ICD-10-CM: G47.33  ICD-9-CM: 327.23         Dyslipidemia (high LDL; low HDL) ICD-10-CM: E78.4  ICD-9-CM: 272.4         Obesity, morbid (more than 100 lbs over ideal weight or BMI > 40) (HCC) ICD-10-CM: E66.01  ICD-9-CM: 278.01         Hyperosmolarity due to secondary diabetes ([facility] [address] ICD-10-CM: E13.00  ICD-9-CM: 249.20         Chest pain, unspecified ICD-10-CM: R07.9  ICD-9-CM: 786.50         Benign essential hypertension ICD-10-CM: I10  ICD-9-CM: 401.1               Past Medical History:      has a [facility] and Hypertension. Past Surgical History:      has no past surgical history on file. Home Medications:     Prior to Admission medications    Medication Sig Start Date End Date Taking? Authorizing Provider   metFORMIN (GLUCOPHAGE) 1,000 mg tablet Take 1 Tab by mouth two (2) times daily (with meals). 16  Yes Jennifer Reece MD   losartan (COZAAR) 100 mg tablet TAKE 1 TABLET BY MOUTH EVERY DAY(NON PAYMENT OF INS) 3/8/16  Yes Jennifer Reece MD       Allergies/Social/Family History: Allergies   Allergen Reactions    Iodine Hives      Social History   Substance Use Topics    Smoking status: Current Every Day Smoker     Packs/day: 1.00    Smokeless tobacco: Never Used    Alcohol use No      Comment: occ      History reviewed. No pertinent family history. Review of Systems:     Pertinent items are noted in HPI.     Objective:   Vital Signs:  Visit Vitals    /76    Pulse 93    Temp 98 °F (36.7 °C)    Resp 19    Ht 6' (1.829 m)    Wt (!) 160.3 kg (353 lb 6.4 oz)    SpO2 (!) 89%    BMI 47.93 kg/m2    O2 Flow Rate (L/min): 4 l/min O2 Device: Nasal cannula Temp (24hrs), Av °F (36.7 °C), Min:97.6 °F (36.4 °C), Max:98.4 °F (36.9 °C)           Intake/Output:     Intake/Output Summary (Last 24 hours) at 17 1023  Last data filed at 17 0959   Gross per 24 hour   Intake 236 ml   Output             2600 ml   Net            -2364 ml       Physical Exam:   General:  Alert, cooperative, no distress, appears stated age. Head:  Normocephalic, without obvious abnormality, atraumatic. Eyes:  Conjunctivae/corneas clear. PERRL, EOMs intact. Neck: Supple, symmetrical, trachea midline, no adenopathy, thyroid: no enlargment/tenderness/nodules, no carotid bruit and no JVD. Increased neck circumference   Lungs:   Diminished BS at bases   Chest wall:  No tenderness or deformity. Heart:  Regular rate and rhythm, S1, S2 normal, no murmur, click, rub or gallop. Abdomen:   Soft, non-tender. Bowel sounds normal. No masses,  No organomegaly. Extremities: Extremities normal, atraumatic, no cyanosis, ++edema. Pulses: 2+ and symmetric all extremities. Skin: Skin color, texture, turgor normal. No rashes or lesions   Neurologic: Grossly nonfocal         LABS AND  DATA: Personally reviewed  Recent Labs      03/12/17   0325  03/11/17   0345   WBC  7.0  8.3   HGB  15.7  14.9   HCT  52.3*  49.5   PLT  173  168     Recent Labs      03/13/17   0315  03/12/17   0325   NA  136  136   K  4.1  3.9   CL  97  98   CO2  33*  35*   BUN  16  20   CREA  0.92  0.92   GLU  118*  119*   CA  8.3*  8.4*   MG  2.0  2.1   PHOS  4.1  3.5     Recent Labs      03/12/17   0325  03/10/17   1401   SGOT  9*  28   AP  66  80   TP  6.3*  7.2   ALB  2.9*  3.5   GLOB  3.4  3.7     No results for input(s): INR, PTP, APTT in the last 72 hours. No lab exists for component: Edmundo Esquivel   Recent Labs      03/12/17   0849  03/11/17   1024  03/11/17   0436   PHI  7.316*  7.281*  7.237*   PCO2I  71.5*  83.3*  85.6*   PO2I  54*  76*  76*   FIO2I   --   50  50     Recent Labs      03/12/17   0325  03/11/17   1212   TROIQ  <0.04  <0.04       MEDS: Reviewed    Chest X-Ray: personally reviewed and report checked    EKG/ Tele      Thank you for allowing me to participate in this patient's care.     Andres Caro MD, FCCP  [facility]

## 2017-03-13 NOTE — DIABETES MGMT
DTC Consult Note    Recommendations/ Comments:  If appropriate, please consider adding consistent carbohydrate to cardiac diet. Spoke with patient regarding diabetes management. He reports that he has required insulin in the past, but recently has only needed Metformin. He has a working glucose meter and avoids sugary drinks, but he states that he eats too much fried food and desserts. Instructed on plate method for meal planning and offered outpatient classes. Pt stated once he is feeling better her will discuss with PCP. Consult received for:  [x]             Assessment of home management      Chart reviewed and initial evaluation complete on Anil Severino. Patient is a 56 y.o. male with a history of Type 2 Diabetes on oral agent (monotherapy): metformin (generic) at home. BG monitoring at home 1-2 times per day. Patient reports BG levels at home trend: stable. Assessed and instructed patient on the following:   ·  interpretation of lab results, blood sugar goals, complications of diabetes mellitus and nutrition      Provided patient with the following: [x]             Survival skills education materials               [x]             Outpatient DTC contact number    Discussed with patient and/or family need for follow up appointment for diabetes management after discharge.       A1c:   Lab Results   Component Value Date/Time    Hemoglobin A1c 7.4 03/10/2017 02:01 PM       Recent Glucose Results: Lab Results   Component Value Date/Time     (H) 03/13/2017 03:15 AM    GLUCPOC 113 (H) 03/13/2017 07:58 AM    GLUCPOC 139 (H) 03/12/2017 10:11 PM    GLUCPOC 93 03/12/2017 05:10 PM        Lab Results   Component Value Date/Time    Creatinine 0.92 03/13/2017 03:15 AM       Active Orders   Diet    DIET CARDIAC Regular        PO intake: Patient Vitals for the past 72 hrs:   % Diet Eaten   03/13/17 0857 100 %       Current hospital DM medication: Humalog for correction, normal sensitivity    Will continue to follow as needed. Thank you.   Gilbert Edmond, MS, RN, CDE

## 2017-03-13 NOTE — PROGRESS NOTES
0800:  Report received from [facility]. Pt resting comfortably sitting on side of bed wearing nasal cannula. 0900:  Pt requesting double portions for breakfast.      1200:  Discussed smoking cessation and weight loss with patient. Pt needs more reinforcement.      1400:  Family at bedside to visit with patient

## 2017-03-13 NOTE — PROGRESS NOTES
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  Can be done as outpt and pts current symptomatology cannot be explained by this mass.

## 2017-03-13 NOTE — PROGRESS NOTES
0800: Report received from [facility]. Pt resting comfortably sitting on side of bed wearing nasal cannula.      0900: Pt requesting double portions for breakfast.      1200: Discussed smoking cessation and weight loss with patient. Pt needs more reinforcement.      1400: Family at bedside to visit with patient. 2000: TRANSFER - OUT REPORT:    Verbal report given to [facility] RN(name) on [employer]  being transferred to [facility]) for routine progression of care       Report consisted of patients Situation, Background, Assessment and   Recommendations(SBAR). Information from the following report(s) SBAR, Kardex, Intake/Output, MAR and Recent Results was reviewed with the receiving nurse. Lines:   Peripheral IV 03/10/17 Left Hand (Active)   Site Assessment Clean, dry, & intact 3/13/2017  8:00 AM   Phlebitis Assessment 0 3/13/2017  8:00 AM   Infiltration Assessment 0 3/13/2017  8:00 AM   Dressing Status Clean, dry, & intact 3/13/2017  8:00 AM   Dressing Type Transparent 3/13/2017  8:00 AM   Hub Color/Line Status Pink 3/13/2017  8:00 AM   Action Taken Open ports on tubing capped 3/13/2017  8:00 AM   Alcohol Cap Used Yes 3/13/2017  8:00 AM        Opportunity for questions and clarification was provided.       Patient transported with:   Monitor  Registered Nurse

## 2017-03-13 NOTE — CDMP QUERY
Patient is noted to have a BMI of 47.93 kg/m. Please clarify if this patient is:     =>Morbidly obese  =>Obese   =>Overweight  =>Other explanation of clinical findings  =>Unable to determine (no explanation for clinical findings)    Presentation: Ht: 6' (1.829 m) & Wt: 160.3 kg (353 lb 6.4 oz)= BMI: 47.93 kg/m     Please clarify and document your clinical opinion in the progress notes and discharge summary, including the definitive and or presumptive diagnosis, (suspected or probable), related to the above clinical findings. Please include clinical findings supporting your diagnosis.      Thank you,    Gael Todd, RN, BSN, [facility], [address] [place]  [phone]

## 2017-03-13 NOTE — PROGRESS NOTES
Hospitalist Progress Note  Duane Pott, MD  Office: [phone]        Date of Service:  3/13/2017  NAME:  Alan Harris  :  1970  MRN:  [id number]      Admission Summary:   67-year-old white male with past medical history of type 2 diabetes mellitus, hypertension and morbid   obesity, who presented to theED with complaint of shortness of breath, dizziness,headache,blurred vision,  ringing in his ears,and \"body jerking,\" tremors. Initial recorded vital signs were blood pressure 123/121, heart   rate 90, respiratory rate 20, O2 saturation 80% on room air. He had elevated D-dimer of 0.91. The patient was placed on BiPAP in ED, so he was not able to obtain a nuclear medicine VQ scan or MRI brain in ED. CT of the head without contrast showed right cavernous sinus mass, for which MRI was recommended in order to evaluate for possible sella lesions. Interval history / Subjective:   Doing well, off bipap on NC 4 lpm.No complaints. Asked if he can order food from outside. Assessment & Plan:     Acute hypoxic hypercapnic respiratory failure,MAXX/OHS  -sec to underlying untreated maxx, tobacco abuse  -Weaned off bipap as of 3/13  -pulm recs apprec,low probability for PE and VQ d/c per pulmonary,lovenox switched to prophylactic dose. Altered mental status. Resolved   -suspect co2 narcosis. Needed BIPAP he is now on NC  -CT of the head without contrast Right cavernous sinus mass    Right cavernous sinus mass  -noted as incidental finding on ct head as above  -Unable to do MRI as he is unable to lie flat  -NS okay to do this as out patient when he is more stable. Elevated BNP (788)  -cxr no effusions, no pulm edema  -repeat bnp today normal at 24  -Echocardiogram with normal EF,no WMA      Generalized weakness and debility, Dizziness  -fall precautions.        Type 2 diabetes mellitus., HbA1c 7.4  -SSI  -cont hold metformin    Mildly Elevated troponin.   -in setting of resp distress. Initial trop 0.05 indeterminate range and repeat 0.04 and 0.04 normal. No indic of mi      Malignant hypertension,BP uncontrolled yet  -Cardene IV infusion titrated off on arrival to floor  -cont cozaar  -Added HCTZ    Chronic bilateral lower extremity edema.   -Dopplers BLE neg for dvt    Tobacco abuse.   -Encourage smoking cessation. Morbid obesity  Body mass index is 47.93 kg/(m^2). -Diet and activity counseled  -Patient says he is limited by back   -He is advised on dietary measurers at least,he is asking for double portions and ordering food from outside.       Code status: full  DVT prophylaxis lovenox    Care Plan discussed with: Patient/Family and Nurse  Disposition: TBD     Hospital Problems  Date Reviewed: 3/10/2017          Codes Class Noted POA    * (Principal)Acute respiratory failure with hypoxia and hypercapnia ([facility] [address] ICD-10-CM: J96.01, J96.02  ICD-9-CM: 518.81  3/10/2017 Unknown                Review of Systems:   A comprehensive review of systems was negative except for that written in the HPI. Vital Signs:    Last 24hrs VS reviewed since prior progress note. Most recent are:  Visit Vitals    BP (!) 162/103 (BP 1 Location: Left arm, BP Patient Position: At rest)    Pulse 87    Temp 98.4 °F (36.9 °C)    Resp 19    Ht 6' (1.829 m)    Wt (!) 160.3 kg (353 lb 6.4 oz)    SpO2 93%    BMI 47.93 kg/m2         Intake/Output Summary (Last 24 hours) at 03/13/17 1740  Last data filed at 03/13/17 0959   Gross per 24 hour   Intake              236 ml   Output             1700 ml   Net            -1464 ml        Physical Examination:             Constitutional:  No acute distress, cooperative, pleasant    ENT:  Oral mucous moist, oropharynx benign. Neck supple,    Resp:  CTA bilaterally. No wheezing/rhonchi/rales. No accessory muscle use   CV:  Regular rhythm, normal rate, no murmurs, gallops, rubs    GI:  Soft, non distended, non tender. normoactive bowel sounds, no hepatosplenomegaly     Musculoskeletal:  No edema, warm, 2+ pulses throughout    Neurologic:  Moves all extremities. AAOx3, CN II-XII reviewed            Data Review:    Review and/or order of clinical lab test      Labs:     Recent Labs      03/12/17   0325  03/11/17   0345   WBC  7.0  8.3   HGB  15.7  14.9   HCT  52.3*  49.5   PLT  173  168     Recent Labs      03/13/17   0315  03/12/17   0325  03/11/17   0345   NA  136  136  139   K  4.1  3.9  4.0   CL  97  98  101   CO2  33*  35*  36*   BUN  16  20  17   CREA  0.92  0.92  0.83   GLU  118*  119*  100   CA  8.3*  8.4*  8.0*   MG  2.0  2.1   --    PHOS  4.1  3.5   --      Recent Labs      03/12/17 0325   SGOT  9*   ALT  23   AP  66   TBILI  0.6   TP  6.3*   ALB  2.9*   GLOB  3.4     No results for input(s): INR, PTP, APTT in the last 72 hours. No lab exists for component: INREXT, INREXT   No results for input(s): FE, TIBC, PSAT, FERR in the last 72 hours. No results found for: FOL, RBCF   No results for input(s): PH, PCO2, PO2 in the last 72 hours.   Recent Labs      03/12/17   0325  03/11/17   1212  03/11/17   0345   TROIQ  <0.04  <0.04  0.04     Lab Results   Component Value Date/Time    Cholesterol, total 182 10/22/2011 04:40 AM    HDL Cholesterol 26 10/22/2011 04:40 AM    LDL, calculated 79.8 10/22/2011 04:40 AM    Triglyceride 381 10/22/2011 04:40 AM    CHOL/HDL Ratio 7.0 10/22/2011 04:40 AM     Lab Results   Component Value Date/Time    Glucose (POC) 113 03/13/2017 04:50 PM    Glucose (POC) 127 03/13/2017 12:21 PM    Glucose (POC) 113 03/13/2017 07:58 AM    Glucose (POC) 139 03/12/2017 10:11 PM    Glucose (POC) 93 03/12/2017 05:10 PM     Lab Results   Component Value Date/Time    Color YELLOW/STRAW 03/11/2017 06:00 AM    Appearance CLEAR 03/11/2017 06:00 AM    Specific gravity 1.009 03/11/2017 06:00 AM    Specific gravity >1.030 02/18/2010 09:20 PM    pH (UA) 5.5 03/11/2017 06:00 AM    Protein NEGATIVE  03/11/2017 06:00 AM Glucose NEGATIVE  03/11/2017 06:00 AM    Ketone NEGATIVE  03/11/2017 06:00 AM    Bilirubin NEGATIVE  03/11/2017 06:00 AM    Urobilinogen 0.2 03/11/2017 06:00 AM    Nitrites NEGATIVE  03/11/2017 06:00 AM    Leukocyte Esterase NEGATIVE  03/11/2017 06:00 AM    Epithelial cells FEW 03/11/2017 06:00 AM    Bacteria NEGATIVE  03/11/2017 06:00 AM    WBC 0-4 03/11/2017 06:00 AM    RBC 0-5 03/11/2017 06:00 AM         Medications Reviewed:     Current Facility-Administered Medications   Medication Dose Route Frequency    albuterol-ipratropium (DUO-NEB) 2.5 MG-0.5 MG/3 ML  3 mL Nebulization QID RT    hydroCHLOROthiazide (HYDRODIURIL) tablet 25 mg  25 mg Oral DAILY    nicotine (NICODERM CQ) 21 mg/24 hr patch 1 Patch  1 Patch TransDERmal DAILY    enoxaparin (LOVENOX) injection 160 mg  1 mg/kg SubCUTAneous Q12H    budesonide (PULMICORT) 500 mcg/2 ml nebulizer suspension  500 mcg Nebulization BID RT    famotidine (PEPCID) tablet 20 mg  20 mg Oral BID    losartan (COZAAR) tablet 100 mg  100 mg Oral DAILY    sodium chloride (NS) flush 5-10 mL  5-10 mL IntraVENous Q8H    sodium chloride (NS) flush 5-10 mL  5-10 mL IntraVENous PRN    glucose chewable tablet 16 g  4 Tab Oral PRN    dextrose (D50W) injection syrg 12.5-25 g  12.5-25 g IntraVENous PRN    glucagon (GLUCAGEN) injection 1 mg  1 mg IntraMUSCular PRN    insulin lispro (HUMALOG) injection   SubCUTAneous AC&HS     ______________________________________________________________________  EXPECTED LENGTH OF STAY: 3d 19h                 Fara Leon MD

## 2017-03-14 ENCOUNTER — APPOINTMENT (OUTPATIENT)
Dept: MRI IMAGING | Age: 47
DRG: 154 | End: 2017-03-14
Attending: EMERGENCY MEDICINE
Payer: COMMERCIAL

## 2017-03-14 LAB
GLUCOSE BLD STRIP.AUTO-MCNC: 103 MG/DL (ref 65–100)
GLUCOSE BLD STRIP.AUTO-MCNC: 167 MG/DL (ref 65–100)
GLUCOSE BLD STRIP.AUTO-MCNC: 204 MG/DL (ref 65–100)
GLUCOSE BLD STRIP.AUTO-MCNC: 279 MG/DL (ref 65–100)
SERVICE CMNT-IMP: ABNORMAL

## 2017-03-14 PROCEDURE — 82962 GLUCOSE BLOOD TEST: CPT

## 2017-03-14 PROCEDURE — 74011250637 HC RX REV CODE- 250/637: Performed by: HOSPITALIST

## 2017-03-14 PROCEDURE — 74011000250 HC RX REV CODE- 250: Performed by: HOSPITALIST

## 2017-03-14 PROCEDURE — 74011250637 HC RX REV CODE- 250/637: Performed by: INTERNAL MEDICINE

## 2017-03-14 PROCEDURE — 74011250637 HC RX REV CODE- 250/637: Performed by: PHYSICIAN ASSISTANT

## 2017-03-14 PROCEDURE — 94640 AIRWAY INHALATION TREATMENT: CPT

## 2017-03-14 PROCEDURE — 74011250636 HC RX REV CODE- 250/636: Performed by: HOSPITALIST

## 2017-03-14 PROCEDURE — 65660000000 HC RM CCU STEPDOWN

## 2017-03-14 PROCEDURE — 74011000250 HC RX REV CODE- 250: Performed by: INTERNAL MEDICINE

## 2017-03-14 PROCEDURE — 74011250637 HC RX REV CODE- 250/637: Performed by: NURSE PRACTITIONER

## 2017-03-14 PROCEDURE — 77010033678 HC OXYGEN DAILY

## 2017-03-14 PROCEDURE — 74011250636 HC RX REV CODE- 250/636: Performed by: FAMILY MEDICINE

## 2017-03-14 RX ORDER — IPRATROPIUM BROMIDE AND ALBUTEROL SULFATE 2.5; .5 MG/3ML; MG/3ML
3 SOLUTION RESPIRATORY (INHALATION)
Status: DISCONTINUED | OUTPATIENT
Start: 2017-03-14 | End: 2017-03-17 | Stop reason: HOSPADM

## 2017-03-14 RX ORDER — BUMETANIDE 1 MG/1
1 TABLET ORAL ONCE
Status: COMPLETED | OUTPATIENT
Start: 2017-03-14 | End: 2017-03-14

## 2017-03-14 RX ADMIN — Medication 10 ML: at 22:19

## 2017-03-14 RX ADMIN — ENOXAPARIN SODIUM 40 MG: 80 INJECTION SUBCUTANEOUS at 22:15

## 2017-03-14 RX ADMIN — BUDESONIDE 500 MCG: 0.5 INHALANT RESPIRATORY (INHALATION) at 21:09

## 2017-03-14 RX ADMIN — IPRATROPIUM BROMIDE AND ALBUTEROL SULFATE 3 ML: .5; 3 SOLUTION RESPIRATORY (INHALATION) at 21:09

## 2017-03-14 RX ADMIN — HYDROCHLOROTHIAZIDE 25 MG: 25 TABLET ORAL at 10:37

## 2017-03-14 RX ADMIN — Medication 10 ML: at 05:53

## 2017-03-14 RX ADMIN — INSULIN LISPRO 3 UNITS: 100 INJECTION, SOLUTION INTRAVENOUS; SUBCUTANEOUS at 17:26

## 2017-03-14 RX ADMIN — LOSARTAN POTASSIUM 100 MG: 50 TABLET ORAL at 10:37

## 2017-03-14 RX ADMIN — BUMETANIDE 1 MG: 1 TABLET ORAL at 12:18

## 2017-03-14 RX ADMIN — IPRATROPIUM BROMIDE AND ALBUTEROL SULFATE 3 ML: .5; 3 SOLUTION RESPIRATORY (INHALATION) at 08:59

## 2017-03-14 RX ADMIN — BUDESONIDE 500 MCG: 0.5 INHALANT RESPIRATORY (INHALATION) at 08:59

## 2017-03-14 RX ADMIN — ENOXAPARIN SODIUM 40 MG: 80 INJECTION SUBCUTANEOUS at 10:37

## 2017-03-14 RX ADMIN — FAMOTIDINE 20 MG: 20 TABLET ORAL at 17:26

## 2017-03-14 RX ADMIN — INSULIN LISPRO 5 UNITS: 100 INJECTION, SOLUTION INTRAVENOUS; SUBCUTANEOUS at 12:18

## 2017-03-14 RX ADMIN — FAMOTIDINE 20 MG: 20 TABLET ORAL at 10:37

## 2017-03-14 RX ADMIN — Medication 10 ML: at 17:27

## 2017-03-14 NOTE — PROGRESS NOTES
Problem: Falls - Risk of  Goal: *Absence of falls  Outcome: Progressing Towards Goal  Patient ambulates with stand by assist, bed wheels locked and bed in low position, fall risk assessed using eliu fall risk scale, personal belonging and call bell in reach. Goal: *Knowledge of fall prevention  Outcome: Progressing Towards Goal  Instructed patient to call for assistance with ambulation, patient verbalizes and demonstrates understanding of teaching. Problem: Breathing Pattern - Ineffective  Goal: *Absence of hypoxia  Outcome: Progressing Towards Goal  VSS. Patient on 3L NC with O2 saturations 95%. Patient requesting not to wear Bipap tonight.

## 2017-03-14 NOTE — PROGRESS NOTES
Pulmonary, Critical Care, and Sleep Medicine~Progress Note    Name: Rosalee Watson MRN: [id number]   : 1970 [facility]   Date: 3/14/2017 10:25 AM Admission: 3/10/2017     IMPRESSION:   · Acute on chronic hypercapnic resp failure   · OHS/MAXX, noncompliant with CPAP outpt basis   · HFpEF with diastolic dysfunction   · Suspected obstructive lung disease though yet confirmed   · Secondary PH  · Abnormal Head CT scan, cavernous sinus mass?? NS involved       PLAN:   · Will see if he is agreeable to trilogy on outpt basis   · Diuretic dosing daily, weight appears up from admission   · Nebs  · Dvt/pud proph  · Will need formal PFTs in 2-3 wks   · O2 titration above 90%      Daily Progression:    Dyspnea stable     OBJECTIVE:     Vital Signs:       Visit Vitals    BP (!) 159/97 (BP 1 Location: Left arm, BP Patient Position: At rest;Supine)    Pulse 83    Temp 98 °F (36.7 °C)    Resp 18    Ht 6' (1.829 m)    Wt (!) 163.5 kg (360 lb 7.2 oz)    SpO2 93%    BMI 48.89 kg/m2      Temp (24hrs), Av.1 °F (36.7 °C), Min:97.9 °F (36.6 °C), Max:98.4 °F (36.9 °C)     Intake/Output:     Last shift:      Last 3 shifts:  1901 -  0700  In: 476 [P.O.:476]  Out: 1400 [Urine:1400]        Intake/Output Summary (Last 24 hours) at 17 1025  Last data filed at 17 0418   Gross per 24 hour   Intake              240 ml   Output                0 ml   Net              240 ml       Imaging:  I have personally reviewed these radiographic films and reports:     I have personally reviewed PFTs:          Ventilation:   Mode Rate Tidal Volume Pressure Suppport FiO2/LPM PEEP Other               Peak airway pressure: 14.1 cm H2O    Minute ventilation: 19.9 l/min    Trilogy:        Physical Exam:                                        Exam Findings Other   General: No resp distress noted, appears stated age    HEENT:  No ulcers, JVD not elevated, no cervical LAD    Chest: No pectus deformity, normal chest rise b/l    HEART:  RRR, no murmurs/rubs/gallops    Lungs:  CTA b/l, no rhonchi/crackles/wheeze, diminished BS at bases    ABD: Soft/NT, non rigid mildly distended    EXT: No cyanosis/clubbing/edema, normal peripheral pulses    Skin: No rashes or ulcers, no mottling    Neuro: A/O x 3        Medications:  Current Facility-Administered Medications   Medication Dose Route Frequency    gadobutrol (Gadavist) contrast solution 7.5 mL  7.5 mL IntraVENous RAD ONCE    albuterol-ipratropium (DUO-NEB) 2.5 MG-0.5 MG/3 ML  3 mL Nebulization QID RT    hydroCHLOROthiazide (HYDRODIURIL) tablet 25 mg  25 mg Oral DAILY    enoxaparin (LOVENOX) injection 40 mg  40 mg SubCUTAneous Q12H    nicotine (NICODERM CQ) 21 mg/24 hr patch 1 Patch  1 Patch TransDERmal DAILY    budesonide (PULMICORT) 500 mcg/2 ml nebulizer suspension  500 mcg Nebulization BID RT    famotidine (PEPCID) tablet 20 mg  20 mg Oral BID    losartan (COZAAR) tablet 100 mg  100 mg Oral DAILY    sodium chloride (NS) flush 5-10 mL  5-10 mL IntraVENous Q8H    sodium chloride (NS) flush 5-10 mL  5-10 mL IntraVENous PRN    glucose chewable tablet 16 g  4 Tab Oral PRN    dextrose (D50W) injection syrg 12.5-25 g  12.5-25 g IntraVENous PRN    glucagon (GLUCAGEN) injection 1 mg  1 mg IntraMUSCular PRN    insulin lispro (HUMALOG) injection   SubCUTAneous AC&HS       Labs:  ABG Recent Labs      03/12/17   0849   PHI  7.316*   PCO2I  71.5*   PO2I  54*   HCO3I  36.5*   SO2I  83*        CBC Recent Labs      03/12/17   0325   WBC  7.0   HGB  15.7   HCT  52.3*   PLT  173   MCV  99.2*   MCH  84.5        Metabolic  Panel Recent Labs      03/13/17   0315  03/12/17   0325   NA  136  136   K  4.1  3.9   CL  97  98   CO2  33*  35*   GLU  118*  119*   BUN  16  20   CREA  0.92  0.92   CA  8.3*  8.4*   MG  2.0  2.1   PHOS  4.1  3.5   ALB   --   2.9*   SGOT   --   9*   ALT   --   23        Pertinent Juan Pablo Murphyma  3/14/2017

## 2017-03-14 NOTE — PROGRESS NOTES
Bedside shift change report given to Calista Guerra RN(oncoming nurse) by Lee Ann Keita RN(offgoing nurse). Report included the following information SBAR, Kardex, ED Summary, Procedure Summary, Intake/Output, MAR, Accordion and Recent Results. Vital signs stable, call bell within reach, patient in no apparent distress. Hourly Rounding performed by [facility].   Peg Frederick RN

## 2017-03-14 NOTE — PROGRESS NOTES
TRANSFER - IN REPORT:    Verbal report received from [facility] Tamayo(name) on Maryan Castleman  being received from CCU(unit) for routine progression of care      Report consisted of patients Situation, Background, Assessment and   Recommendations(SBAR). Information from the following report(s) SBAR, Kardex, Intake/Output and Cardiac Rhythm NSR was reviewed with the receiving nurse. Opportunity for questions and clarification was provided. Assessment completed upon patients arrival to unit and care assumed. 9443- Patient off floor for MRI  0710- Patient unable to tolerate laying flat for MRI. MRI not completed. 0745- Bedside and Verbal shift change report given to Alice Handley (oncoming nurse) by Daniel Vargas (offgoing nurse). Report included the following information SBAR, Kardex, Intake/Output, MAR and Cardiac Rhythm NSR.

## 2017-03-14 NOTE — PROGRESS NOTES
ADULT PROTOCOL: JET AEROSOL  REASSESSMENT    Patient  Elyssa Severino     56 y.o.   male     3/14/2017  11:38 AM    Breath Sounds Pre Procedure: Right Breath Sounds: Diminished                               Left Breath Sounds: Diminished    Breath Sounds Post Procedure: Right Breath Sounds: Diminished                                 Left Breath Sounds: Diminished    Breathing pattern: Pre procedure Breathing Pattern: Regular          Post procedure Breathing Pattern: Regular    Heart Rate: Pre procedure Pulse: 93           Post procedure Pulse: 86    Resp Rate: Pre procedure Respirations: 18           Post procedure Respirations: 11    Peak Flow: Pre bronchodilator             Post bronchodilator       FVC/FEV1:  na    Incentive Spirometry:  Actual Volume (ml): 1200 ml          Cough: Pre procedure Cough: Non-productive               Post procedure Cough: Non-productive    Suctioned: NO    Sputum: Pre procedure                   Post procedure      Oxygen: O2 Device: Nasal cannula   3L     Changed: NO    SpO2: Pre procedure SpO2: 92 %   with oxygen              Post procedure Sp02 92%  with oxygen    Nebulizer Therapy: Current medications Aerosolized Medications: Pulmicort, DuoNeb      Changed: YES BID given with BID Pulmicort. BS diminished. Does not take nebs at home.     Smoking History: unknown    Problem List:   Patient Active Problem List   Diagnosis Code    Hyperosmolarity due to secondary diabetes ([facility] [address] E13.00    Chest pain, unspecified R07.9    Benign essential hypertension I10    Dyslipidemia (high LDL; low HDL) E78.4    Obesity, morbid (more than 100 lbs over ideal weight or BMI > 40) (HCC) E66.01    MAXX (obstructive sleep apnea) G47.33    Acute respiratory failure with hypoxia and hypercapnia ([facility] [address] J96.01, J96.02       Respiratory Therapist: Sue Hurt, RT

## 2017-03-15 ENCOUNTER — APPOINTMENT (OUTPATIENT)
Dept: GENERAL RADIOLOGY | Age: 47
DRG: 154 | End: 2017-03-15
Attending: PHYSICIAN ASSISTANT
Payer: COMMERCIAL

## 2017-03-15 LAB
GLUCOSE BLD STRIP.AUTO-MCNC: 130 MG/DL (ref 65–100)
GLUCOSE BLD STRIP.AUTO-MCNC: 132 MG/DL (ref 65–100)
GLUCOSE BLD STRIP.AUTO-MCNC: 150 MG/DL (ref 65–100)
GLUCOSE BLD STRIP.AUTO-MCNC: 182 MG/DL (ref 65–100)
SERVICE CMNT-IMP: ABNORMAL

## 2017-03-15 PROCEDURE — 65660000000 HC RM CCU STEPDOWN

## 2017-03-15 PROCEDURE — 74011250637 HC RX REV CODE- 250/637: Performed by: PHYSICIAN ASSISTANT

## 2017-03-15 PROCEDURE — 77010033678 HC OXYGEN DAILY

## 2017-03-15 PROCEDURE — 74011250637 HC RX REV CODE- 250/637: Performed by: HOSPITALIST

## 2017-03-15 PROCEDURE — 74011000250 HC RX REV CODE- 250: Performed by: INTERNAL MEDICINE

## 2017-03-15 PROCEDURE — 94640 AIRWAY INHALATION TREATMENT: CPT

## 2017-03-15 PROCEDURE — 82962 GLUCOSE BLOOD TEST: CPT

## 2017-03-15 PROCEDURE — 74011000250 HC RX REV CODE- 250: Performed by: HOSPITALIST

## 2017-03-15 PROCEDURE — 74011250637 HC RX REV CODE- 250/637: Performed by: INTERNAL MEDICINE

## 2017-03-15 PROCEDURE — 74011250636 HC RX REV CODE- 250/636: Performed by: FAMILY MEDICINE

## 2017-03-15 PROCEDURE — 74011250636 HC RX REV CODE- 250/636: Performed by: HOSPITALIST

## 2017-03-15 PROCEDURE — 71010 XR CHEST PORT: CPT

## 2017-03-15 PROCEDURE — 74011250637 HC RX REV CODE- 250/637: Performed by: NURSE PRACTITIONER

## 2017-03-15 RX ORDER — BUMETANIDE 1 MG/1
1 TABLET ORAL ONCE
Status: COMPLETED | OUTPATIENT
Start: 2017-03-15 | End: 2017-03-15

## 2017-03-15 RX ORDER — LORATADINE 10 MG/1
10 TABLET ORAL DAILY
Status: DISCONTINUED | OUTPATIENT
Start: 2017-03-15 | End: 2017-03-17 | Stop reason: HOSPADM

## 2017-03-15 RX ORDER — LORATADINE 10 MG/1
10 TABLET ORAL DAILY
Status: DISCONTINUED | OUTPATIENT
Start: 2017-03-16 | End: 2017-03-15

## 2017-03-15 RX ADMIN — Medication 10 ML: at 13:58

## 2017-03-15 RX ADMIN — IPRATROPIUM BROMIDE AND ALBUTEROL SULFATE 3 ML: .5; 3 SOLUTION RESPIRATORY (INHALATION) at 20:43

## 2017-03-15 RX ADMIN — Medication 10 ML: at 06:00

## 2017-03-15 RX ADMIN — ENOXAPARIN SODIUM 40 MG: 80 INJECTION SUBCUTANEOUS at 21:14

## 2017-03-15 RX ADMIN — BUDESONIDE 500 MCG: 0.5 INHALANT RESPIRATORY (INHALATION) at 20:43

## 2017-03-15 RX ADMIN — IPRATROPIUM BROMIDE AND ALBUTEROL SULFATE 3 ML: .5; 3 SOLUTION RESPIRATORY (INHALATION) at 08:36

## 2017-03-15 RX ADMIN — Medication 10 ML: at 21:18

## 2017-03-15 RX ADMIN — ENOXAPARIN SODIUM 40 MG: 80 INJECTION SUBCUTANEOUS at 09:28

## 2017-03-15 RX ADMIN — BUMETANIDE 1 MG: 1 TABLET ORAL at 12:18

## 2017-03-15 RX ADMIN — LORATADINE 10 MG: 10 TABLET ORAL at 18:28

## 2017-03-15 RX ADMIN — FAMOTIDINE 20 MG: 20 TABLET ORAL at 09:28

## 2017-03-15 RX ADMIN — INSULIN LISPRO 2 UNITS: 100 INJECTION, SOLUTION INTRAVENOUS; SUBCUTANEOUS at 12:19

## 2017-03-15 RX ADMIN — FAMOTIDINE 20 MG: 20 TABLET ORAL at 17:07

## 2017-03-15 RX ADMIN — BUDESONIDE 500 MCG: 0.5 INHALANT RESPIRATORY (INHALATION) at 08:36

## 2017-03-15 RX ADMIN — LOSARTAN POTASSIUM 100 MG: 50 TABLET ORAL at 09:28

## 2017-03-15 RX ADMIN — INSULIN LISPRO 2 UNITS: 100 INJECTION, SOLUTION INTRAVENOUS; SUBCUTANEOUS at 17:07

## 2017-03-15 RX ADMIN — HYDROCHLOROTHIAZIDE 25 MG: 25 TABLET ORAL at 09:28

## 2017-03-15 NOTE — PROGRESS NOTES
Pulmonary, Critical Care, and Sleep Medicine~Progress Note    Name: Myranda Sharma MRN: [id number]   : 1970 [facility]   Date: 3/15/2017 10:25 AM Admission: 3/10/2017     IMPRESSION:   · Acute on chronic hypercapnic resp failure   · OHS/MAXX, noncompliant with CPAP outpt basis   · HFpEF with diastolic dysfunction   · Likely has obstructive lung disease; smokes 1.5 ppds   · Secondary PH  · Abnormal Head CT scan, cavernous sinus mass?? NS involved       PLAN:   · Had long discussion with patient about home ventilation and its pros and cons. He appear to understood with some verbal cues. He was agreeable to move forward with evaluation and setup with trilogy  · Trilogy: Target Tidal volumes= 600ml; EPAP min 5cmh2o, EPAP max 8cmh2o, IPAP [id number], IPAP max [id number], I-time 0.85 sec, Back up rate auto, FiO2 above 90%.  Mask of choice  · Diuretic dosing daily, weight appears up from admission   · Nebs  · Dvt/pud proph  · Will need formal PFTs in 2-3 wks   · O2 titration above 90%      Daily Progression:    Dyspnea stable     OBJECTIVE:     Vital Signs:       Visit Vitals    BP (!) 170/105 (BP 1 Location: Right arm, BP Patient Position: At rest)    Pulse 85    Temp 98.2 °F (36.8 °C)    Resp 20    Ht 6' (1.829 m)    Wt (!) 164 kg (361 lb 8.9 oz)    SpO2 93%    BMI 49.04 kg/m2      Temp (24hrs), Av.9 °F (36.6 °C), Min:97.6 °F (36.4 °C), Max:98.2 °F (36.8 °C)     Intake/Output:     Last shift: 03/15 07 - 03/15 190  In: 500 [P.O.:500]  Out: -     Last 3 shifts:  1901 - 03/15 0700  In: 1360 [P.O.:1360]  Out: -           Intake/Output Summary (Last 24 hours) at 03/15/17 1033  Last data filed at 03/15/17 0830   Gross per 24 hour   Intake             1620 ml   Output                0 ml   Net             1620 ml       Ventilation:   Mode Rate Tidal Volume Pressure Suppport FiO2/LPM PEEP Other Peak airway pressure: 14.1 cm H2O    Minute ventilation: 19.9 l/min    Trilogy:        Physical Exam:                                        Exam Findings Other   General: No resp distress noted, appears stated age    [de-identified]:  No ulcers, JVD not elevated, no cervical LAD    Chest: No pectus deformity, normal chest rise b/l    HEART:  RRR, no murmurs/rubs/gallops    Lungs:  CTA b/l, no rhonchi/crackles/wheeze, diminished BS at bases    ABD: Soft/NT, non rigid mildly distended    EXT: No cyanosis/clubbing/edema, normal peripheral pulses    Skin: No rashes or ulcers, no mottling    Neuro: A/O x 3        Medications:  Current Facility-Administered Medications   Medication Dose Route Frequency    albuterol-ipratropium (DUO-NEB) 2.5 MG-0.5 MG/3 ML  3 mL Nebulization BID RT    hydroCHLOROthiazide (HYDRODIURIL) tablet 25 mg  25 mg Oral DAILY    enoxaparin (LOVENOX) injection 40 mg  40 mg SubCUTAneous Q12H    nicotine (NICODERM CQ) 21 mg/24 hr patch 1 Patch  1 Patch TransDERmal DAILY    budesonide (PULMICORT) 500 mcg/2 ml nebulizer suspension  500 mcg Nebulization BID RT    famotidine (PEPCID) tablet 20 mg  20 mg Oral BID    losartan (COZAAR) tablet 100 mg  100 mg Oral DAILY    sodium chloride (NS) flush 5-10 mL  5-10 mL IntraVENous Q8H    sodium chloride (NS) flush 5-10 mL  5-10 mL IntraVENous PRN    glucose chewable tablet 16 g  4 Tab Oral PRN    dextrose (D50W) injection syrg 12.5-25 g  12.5-25 g IntraVENous PRN    glucagon (GLUCAGEN) injection 1 mg  1 mg IntraMUSCular PRN    insulin lispro (HUMALOG) injection   SubCUTAneous AC&HS       Labs:  ABG No results for input(s): PHI, PCO2I, PO2I, HCO3I, SO2I, FIO2I in the last 72 hours. CBC No results for input(s): WBC, HGB, HCT, PLT, MCV, MCH, HGBEXT, HCTEXT, PLTEXT, HGBEXT, HCTEXT, PLTEXT in the last 72 hours.      Metabolic  Panel Recent Labs      03/13/17   0315   NA  136   K  4.1   CL  97   CO2  33*   GLU  118*   BUN  16   CREA  0.92   CA  8.3*   MG  2.0 PHOS  4.1        Pertinent Labs                Marc Simeon, [address]  3/15/2017

## 2017-03-15 NOTE — PROGRESS NOTES
Hospitalist Progress Note  Galina Elliott MD  Office: [phone]        Date of Service:  3/14/2017  NAME:  Oscar Belle  :  1970  MRN:  [id number]      Admission Summary:   64-year-old white male with past medical history of type 2 diabetes mellitus, hypertension and morbid   obesity, who presented to theED with complaint of shortness of breath, dizziness,headache,blurred vision,  ringing in his ears,and \"body jerking,\" tremors. Initial recorded vital signs were blood pressure 123/121, heart   rate 90, respiratory rate 20, O2 saturation 80% on room air. He had elevated D-dimer of 0.91. The patient was placed on BiPAP in ED, so he was not able to obtain a nuclear medicine VQ scan or MRI brain in ED. CT of the head without contrast showed right cavernous sinus mass, for which MRI was recommended in order to evaluate for possible sella lesions. Interval history / Subjective:   Seen in IMCU room 418. Sitting in chair dozing off. Arousable,and no complaints. Wearing oxygen per NC at 3 LPM,sats>93% during my stay in rounds. Assessment & Plan:     Acute hypoxic hypercapnic respiratory failure,MAXX/OHS  -sec to underlying untreated maxx, tobacco abuse  -Weaned off bipap as of 3/13  -pulm recs apprec,low probability for PE and VQ d/c per pulmonary,lovenox switched to prophylactic dose.  -Agree with pulmonary he needs trilogy. Will check ABG in AM,he was sleepy today ,will see if he is retaining co2,he has off bipap last night    Altered mental status. Resolved   -suspect co2 narcosis. Needed BIPAP he is now on NC  -CT of the head without contrast Right cavernous sinus mass    Right cavernous sinus mass  -noted as incidental finding on ct head as above  -Unable to do MRI as he is unable to lie flat  -NS okay to do this as out patient when he is more stable.  -3/14,attempted MRI brain    Elevated BNP (788)  -cxr no effusions, no pulm edema  -repeat bnp today normal at 24  -Echocardiogram with normal EF,no WMA      Generalized weakness and debility, Dizziness  -fall precautions. Type 2 diabetes mellitus., HbA1c 7.4  -SSI  -cont hold metformin    Mildly Elevated troponin.   -in setting of resp distress. Initial trop 0.05 indeterminate range and repeat 0.04 and 0.04 normal. No indic of mi      Malignant hypertension,BP uncontrolled yet  -Cardene IV infusion titrated off on arrival to floor  -cont cozaar  -Added HCTZ on 3/13,now BP better    Chronic bilateral lower extremity edema.   -Dopplers BLE neg for dvt    Tobacco abuse.   -Encourage smoking cessation. Morbid obesity  Body mass index is 48.89 kg/(m^2). -Diet and activity counseled  -Patient says he is limited by back   -He is advised on dietary measurers at least,he is asking for double portions and ordering food from outside.       Code status: full  DVT prophylaxis lovenox    Care Plan discussed with: Patient/Family and Nurse  Disposition: TBD     Hospital Problems  Date Reviewed: 3/10/2017          Codes Class Noted POA    * (Principal)Acute respiratory failure with hypoxia and hypercapnia ([facility] [address] ICD-10-CM: J96.01, J96.02  ICD-9-CM: 518.81  3/10/2017 Unknown                Review of Systems:   A comprehensive review of systems was negative except for that written in the HPI. Vital Signs:    Last 24hrs VS reviewed since prior progress note. Most recent are:  Visit Vitals    /89    Pulse 87    Temp 97.6 °F (36.4 °C)    Resp 18    Ht 6' (1.829 m)    Wt (!) 163.5 kg (360 lb 7.2 oz)    SpO2 95%    BMI 48.89 kg/m2         Intake/Output Summary (Last 24 hours) at 03/14/17 2051  Last data filed at 03/14/17 1940   Gross per 24 hour   Intake             1360 ml   Output                0 ml   Net             1360 ml        Physical Examination:             Constitutional:  No acute distress, cooperative, pleasant    ENT:  Oral mucous moist, oropharynx benign.  Neck supple, Resp: CTA bilaterally. No wheezing/rhonchi/rales. No accessory muscle use   CV:  Regular rhythm, normal rate, no murmurs, gallops, rubs    GI:  Soft, non distended, non tender. normoactive bowel sounds, no hepatosplenomegaly     Musculoskeletal:  No edema, warm, 2+ pulses throughout    Neurologic:  Moves all extremities. AAOx3, CN II-XII reviewed            Data Review:    Review and/or order of clinical lab test      Labs:     Recent Labs      03/12/17   0325   WBC  7.0   HGB  15.7   HCT  52.3*   PLT  173     Recent Labs      03/13/17   0315  03/12/17   0325   NA  136  136   K  4.1  3.9   CL  97  98   CO2  33*  35*   BUN  16  20   CREA  0.92  0.92   GLU  118*  119*   CA  8.3*  8.4*   MG  2.0  2.1   PHOS  4.1  3.5     Recent Labs      03/12/17   0325   SGOT  9*   ALT  23   AP  66   TBILI  0.6   TP  6.3*   ALB  2.9*   GLOB  3.4     No results for input(s): INR, PTP, APTT in the last 72 hours. No lab exists for component: INREXT, INREXT   No results for input(s): FE, TIBC, PSAT, FERR in the last 72 hours. No results found for: FOL, RBCF   No results for input(s): PH, PCO2, PO2 in the last 72 hours.   Recent Labs      03/12/17 0325   TROIQ  <0.04     Lab Results   Component Value Date/Time    Cholesterol, total 182 10/22/2011 04:40 AM    HDL Cholesterol 26 10/22/2011 04:40 AM    LDL, calculated 79.8 10/22/2011 04:40 AM    Triglyceride 381 10/22/2011 04:40 AM    CHOL/HDL Ratio 7.0 10/22/2011 04:40 AM     Lab Results   Component Value Date/Time    Glucose (POC) 204 03/14/2017 04:43 PM    Glucose (POC) 279 03/14/2017 11:46 AM    Glucose (POC) 103 03/14/2017 07:40 AM    Glucose (POC) 174 03/13/2017 10:05 PM    Glucose (POC) 113 03/13/2017 04:50 PM     Lab Results   Component Value Date/Time    Color YELLOW/STRAW 03/11/2017 06:00 AM    Appearance CLEAR 03/11/2017 06:00 AM    Specific gravity 1.009 03/11/2017 06:00 AM    Specific gravity >1.030 02/18/2010 09:20 PM    pH (UA) 5.5 03/11/2017 06:00 AM    Protein NEGATIVE  03/11/2017 06:00 AM    Glucose NEGATIVE  03/11/2017 06:00 AM    Ketone NEGATIVE  03/11/2017 06:00 AM    Bilirubin NEGATIVE  03/11/2017 06:00 AM    Urobilinogen 0.2 03/11/2017 06:00 AM    Nitrites NEGATIVE  03/11/2017 06:00 AM    Leukocyte Esterase NEGATIVE  03/11/2017 06:00 AM    Epithelial cells FEW 03/11/2017 06:00 AM    Bacteria NEGATIVE  03/11/2017 06:00 AM    WBC 0-4 03/11/2017 06:00 AM    RBC 0-5 03/11/2017 06:00 AM         Medications Reviewed:     Current Facility-Administered Medications   Medication Dose Route Frequency    gadobutrol (Gadavist) contrast solution 7.5 mL  7.5 mL IntraVENous RAD ONCE    albuterol-ipratropium (DUO-NEB) 2.5 MG-0.5 MG/3 ML  3 mL Nebulization BID RT    hydroCHLOROthiazide (HYDRODIURIL) tablet 25 mg  25 mg Oral DAILY    enoxaparin (LOVENOX) injection 40 mg  40 mg SubCUTAneous Q12H    nicotine (NICODERM CQ) 21 mg/24 hr patch 1 Patch  1 Patch TransDERmal DAILY    budesonide (PULMICORT) 500 mcg/2 ml nebulizer suspension  500 mcg Nebulization BID RT    famotidine (PEPCID) tablet 20 mg  20 mg Oral BID    losartan (COZAAR) tablet 100 mg  100 mg Oral DAILY    sodium chloride (NS) flush 5-10 mL  5-10 mL IntraVENous Q8H    sodium chloride (NS) flush 5-10 mL  5-10 mL IntraVENous PRN    glucose chewable tablet 16 g  4 Tab Oral PRN    dextrose (D50W) injection syrg 12.5-25 g  12.5-25 g IntraVENous PRN    glucagon (GLUCAGEN) injection 1 mg  1 mg IntraMUSCular PRN    insulin lispro (HUMALOG) injection   SubCUTAneous AC&HS     ______________________________________________________________________  EXPECTED LENGTH OF STAY: 3d 19h                 Manuel Andino MD

## 2017-03-15 NOTE — PROGRESS NOTES
Chart reviewed. Received an order for Trilogy set up. Order sent via [employer] to [address] [place] [address] via [facility] for Trilogy set up for the patient to try tonight. CM will follow. 3:40pm-Notified by [address] [place] [address] that they do not contract with the patient's insurance and there are no out of network benefits. MINDY called and spoke with Blank Dennis from [facility] to see if they could provide the Trilogy. Blank Dennis called back and stated that they could but authorization would take a little while so they could not set the patient up until tomorrow to try the machine Thursday night. Orders sent via [employer] to [facility] [address]. Will follow up tomorrow to make sure they can provide the machine. 5pm-Received a call back from Blank Dennis with [facility] who had reviewed information and stated that he did not think that he was going to be able to get the patient's insurance to approve the Trilogy. The documentation from pulmonology stated his respiratory failure is acute on chronic and the documentation needs to be chronic respiratory failure. Also the documentation shows that he has been non-compliant with the CPAP previously so the insurance will likely see that he has been previously non-compliant and will likely not want to pay for a more expensive machine. He explained that breathing apparatus is pretty similar with CPAP vs Trilogy so if the patient does not like the CPAP mask and this is the reason he is not using it, he will likely not use the Trilogy machine either. CM paged Dr. David Motley to speak with him to let him know this but did not receive a call back. Will follow up tomorrow to see what MD would like to do.   Dallas Ackerman RN BS CRM

## 2017-03-15 NOTE — PROGRESS NOTES
Hospitalist Progress Note  Louise Kebede MD  Office: [phone]        Date of Service:  3/15/2017  NAME:  Chucho Charles  :  1970  MRN:  [id number]      Admission Summary:   43-year-old white male with past medical history of type 2 diabetes mellitus, hypertension and morbid   obesity, who presented to theED with complaint of shortness of breath, dizziness,headache,blurred vision,  ringing in his ears,and \"body jerking,\" tremors. Initial recorded vital signs were blood pressure 123/121, heart   rate 90, respiratory rate 20, O2 saturation 80% on room air. He had elevated D-dimer of 0.91. The patient was placed on BiPAP in ED, so he was not able to obtain a nuclear medicine VQ scan or MRI brain in ED. CT of the head without contrast showed right cavernous sinus mass, for which MRI was recommended in order to evaluate for possible sella lesions. Interval history / Subjective:   Seen in IMCU room 418. No complaints,he said he slept well. Nurses woke him up for his oxygen went down too low. He now agreed for trilogy. Assessment & Plan:     Acute hypoxic hypercapnic respiratory failure,MAXX/OHS  -sec to underlying untreated maxx, tobacco abuse  -Weaned off bipap as of 3/13  -pulm recs apprec,low probability for PE and VQ d/c per pulmonary,lovenox switched to prophylactic dose.  -Agree with pulmonary he needs trilogy,patient now in agreement. Altered mental status. Resolved   -suspect co2 narcosis. Needed BIPAP he is now on NC  -CT of the head without contrast Right cavernous sinus mass    Right cavernous sinus mass  -noted as incidental finding on ct head as above  -Unable to do MRI as he is unable to lie flat  -NS okay to do this as out patient when he is more stable.  -3/14,attempted MRI brain    Elevated BNP (788)  -cxr no effusions, no pulm edema  -repeat bnp today normal at 24  -Echocardiogram with normal EF,no WMA      Generalized weakness and debility, Dizziness  -fall precautions. Type 2 diabetes mellitus., HbA1c 7.4  -SSI  -cont hold metformin,resume on discharge    Mildly Elevated troponin.   -in setting of resp distress. Initial trop 0.05 indeterminate range and repeat 0.04 and 0.04 normal. No indic of mi      Malignant hypertension,BP uncontrolled yet  -  -Cardene IV infusion titrated off on arrival to floor  -cont cozaar  -Added HCTZ on 3/13,now BP better  -CPAP would definitely help with his BP as well    Chronic bilateral lower extremity edema.   -Dopplers BLE neg for dvt    Tobacco abuse.   -Encourage smoking cessation. Morbid obesity  Body mass index is 49.04 kg/(m^2). -Diet and activity counseled  -Patient says he is limited by back   -He is advised on dietary measurers at least,he is asking for double portions and ordering food from outside.       Code status: full  DVT prophylaxis lovenox    Care Plan discussed with: Patient/Family and Nurse  Disposition: home once trilogy is delivered. Hospital Problems  Date Reviewed: 3/10/2017          Codes Class Noted POA    * (Principal)Acute respiratory failure with hypoxia and hypercapnia (HCC) ICD-10-CM: J96.01, J96.02  ICD-9-CM: 518.81  3/10/2017 Unknown                Review of Systems:   A comprehensive review of systems was negative except for that written in the HPI. Vital Signs:    Last 24hrs VS reviewed since prior progress note.  Most recent are:  Visit Vitals    BP (!) 170/105 (BP 1 Location: Right arm, BP Patient Position: At rest)    Pulse 85    Temp 98.2 °F (36.8 °C)    Resp 20    Ht 6' (1.829 m)    Wt (!) 164 kg (361 lb 8.9 oz)    SpO2 93%    BMI 49.04 kg/m2         Intake/Output Summary (Last 24 hours) at 03/15/17 0958  Last data filed at 03/15/17 0830   Gross per 24 hour   Intake             1620 ml   Output                0 ml   Net             1620 ml        Physical Examination:             Constitutional:  No acute distress, cooperative, pleasant    ENT:  Oral mucous moist, oropharynx benign. Neck supple,    Resp:  CTA bilaterally. No wheezing/rhonchi/rales. No accessory muscle use   CV:  Regular rhythm, normal rate, no murmurs, gallops, rubs    GI:  Soft, non distended, non tender. normoactive bowel sounds, no hepatosplenomegaly     Musculoskeletal:  No edema, warm, 2+ pulses throughout    Neurologic:  Moves all extremities. AAOx3, CN II-XII reviewed            Data Review:    Review and/or order of clinical lab test      Labs:     No results for input(s): WBC, HGB, HCT, PLT, HGBEXT, HCTEXT, PLTEXT, HGBEXT, HCTEXT, PLTEXT in the last 72 hours. Recent Labs      03/13/17   0315   NA  136   K  4.1   CL  97   CO2  33*   BUN  16   CREA  0.92   GLU  118*   CA  8.3*   MG  2.0   PHOS  4.1     No results for input(s): SGOT, GPT, ALT, AP, TBIL, TBILI, TP, ALB, GLOB, GGT, AML, LPSE in the last 72 hours. No lab exists for component: AMYP, HLPSE  No results for input(s): INR, PTP, APTT in the last 72 hours. No lab exists for component: INREXT, INREXT   No results for input(s): FE, TIBC, PSAT, FERR in the last 72 hours. No results found for: FOL, RBCF   No results for input(s): PH, PCO2, PO2 in the last 72 hours. No results for input(s): CPK, CKNDX, TROIQ in the last 72 hours.     No lab exists for component: CPKMB  Lab Results   Component Value Date/Time    Cholesterol, total 182 10/22/2011 04:40 AM    HDL Cholesterol 26 10/22/2011 04:40 AM    LDL, calculated 79.8 10/22/2011 04:40 AM    Triglyceride 381 10/22/2011 04:40 AM    CHOL/HDL Ratio 7.0 10/22/2011 04:40 AM     Lab Results   Component Value Date/Time    Glucose (POC) 130 03/15/2017 06:48 AM    Glucose (POC) 167 03/14/2017 09:12 PM    Glucose (POC) 204 03/14/2017 04:43 PM    Glucose (POC) 279 03/14/2017 11:46 AM    Glucose (POC) 103 03/14/2017 07:40 AM     Lab Results   Component Value Date/Time    Color YELLOW/STRAW 03/11/2017 06:00 AM    Appearance CLEAR 03/11/2017 06:00 AM    Specific gravity 1.009 03/11/2017 06:00 AM    Specific gravity >1.030 02/18/2010 09:20 PM    pH (UA) 5.5 03/11/2017 06:00 AM    Protein NEGATIVE  03/11/2017 06:00 AM    Glucose NEGATIVE  03/11/2017 06:00 AM    Ketone NEGATIVE  03/11/2017 06:00 AM    Bilirubin NEGATIVE  03/11/2017 06:00 AM    Urobilinogen 0.2 03/11/2017 06:00 AM    Nitrites NEGATIVE  03/11/2017 06:00 AM    Leukocyte Esterase NEGATIVE  03/11/2017 06:00 AM    Epithelial cells FEW 03/11/2017 06:00 AM    Bacteria NEGATIVE  03/11/2017 06:00 AM    WBC 0-4 03/11/2017 06:00 AM    RBC 0-5 03/11/2017 06:00 AM         Medications Reviewed:     Current Facility-Administered Medications   Medication Dose Route Frequency    albuterol-ipratropium (DUO-NEB) 2.5 MG-0.5 MG/3 ML  3 mL Nebulization BID RT    hydroCHLOROthiazide (HYDRODIURIL) tablet 25 mg  25 mg Oral DAILY    enoxaparin (LOVENOX) injection 40 mg  40 mg SubCUTAneous Q12H    nicotine (NICODERM CQ) 21 mg/24 hr patch 1 Patch  1 Patch TransDERmal DAILY    budesonide (PULMICORT) 500 mcg/2 ml nebulizer suspension  500 mcg Nebulization BID RT    famotidine (PEPCID) tablet 20 mg  20 mg Oral BID    losartan (COZAAR) tablet 100 mg  100 mg Oral DAILY    sodium chloride (NS) flush 5-10 mL  5-10 mL IntraVENous Q8H    sodium chloride (NS) flush 5-10 mL  5-10 mL IntraVENous PRN    glucose chewable tablet 16 g  4 Tab Oral PRN    dextrose (D50W) injection syrg 12.5-25 g  12.5-25 g IntraVENous PRN    glucagon (GLUCAGEN) injection 1 mg  1 mg IntraMUSCular PRN    insulin lispro (HUMALOG) injection   SubCUTAneous AC&HS     ______________________________________________________________________  EXPECTED LENGTH OF STAY: 3d 19h                 Kerry Orlando MD

## 2017-03-15 NOTE — PROGRESS NOTES
Problem: Patient Education: Go to Patient Education Activity  Goal: Patient/Family Education  Outcome: Not Progressing Towards Goal  Educated on cardiac and diabetic diet. Gave examples of good food choices and food he should avoid. Understanding voiced but continues to order additional meal trays.

## 2017-03-16 LAB
GLUCOSE BLD STRIP.AUTO-MCNC: 121 MG/DL (ref 65–100)
GLUCOSE BLD STRIP.AUTO-MCNC: 136 MG/DL (ref 65–100)
GLUCOSE BLD STRIP.AUTO-MCNC: 137 MG/DL (ref 65–100)
GLUCOSE BLD STRIP.AUTO-MCNC: 150 MG/DL (ref 65–100)
SERVICE CMNT-IMP: ABNORMAL

## 2017-03-16 PROCEDURE — 74011250637 HC RX REV CODE- 250/637: Performed by: INTERNAL MEDICINE

## 2017-03-16 PROCEDURE — 77010033678 HC OXYGEN DAILY

## 2017-03-16 PROCEDURE — 74011000250 HC RX REV CODE- 250: Performed by: INTERNAL MEDICINE

## 2017-03-16 PROCEDURE — 65660000000 HC RM CCU STEPDOWN

## 2017-03-16 PROCEDURE — 74011250636 HC RX REV CODE- 250/636: Performed by: FAMILY MEDICINE

## 2017-03-16 PROCEDURE — 74011250637 HC RX REV CODE- 250/637: Performed by: NURSE PRACTITIONER

## 2017-03-16 PROCEDURE — 74011000250 HC RX REV CODE- 250: Performed by: HOSPITALIST

## 2017-03-16 PROCEDURE — 74011250637 HC RX REV CODE- 250/637: Performed by: HOSPITALIST

## 2017-03-16 PROCEDURE — 74011250636 HC RX REV CODE- 250/636: Performed by: HOSPITALIST

## 2017-03-16 PROCEDURE — 82962 GLUCOSE BLOOD TEST: CPT

## 2017-03-16 PROCEDURE — 94640 AIRWAY INHALATION TREATMENT: CPT

## 2017-03-16 PROCEDURE — 74011250637 HC RX REV CODE- 250/637: Performed by: PHYSICIAN ASSISTANT

## 2017-03-16 RX ORDER — BUMETANIDE 1 MG/1
1 TABLET ORAL ONCE
Status: COMPLETED | OUTPATIENT
Start: 2017-03-16 | End: 2017-03-16

## 2017-03-16 RX ADMIN — Medication 5 ML: at 06:00

## 2017-03-16 RX ADMIN — IPRATROPIUM BROMIDE AND ALBUTEROL SULFATE 3 ML: .5; 3 SOLUTION RESPIRATORY (INHALATION) at 08:09

## 2017-03-16 RX ADMIN — BUMETANIDE 1 MG: 1 TABLET ORAL at 12:00

## 2017-03-16 RX ADMIN — HYDROCHLOROTHIAZIDE 25 MG: 25 TABLET ORAL at 09:40

## 2017-03-16 RX ADMIN — Medication 10 ML: at 22:16

## 2017-03-16 RX ADMIN — LORATADINE 10 MG: 10 TABLET ORAL at 09:40

## 2017-03-16 RX ADMIN — IPRATROPIUM BROMIDE AND ALBUTEROL SULFATE 3 ML: .5; 3 SOLUTION RESPIRATORY (INHALATION) at 19:32

## 2017-03-16 RX ADMIN — ENOXAPARIN SODIUM 40 MG: 80 INJECTION SUBCUTANEOUS at 22:18

## 2017-03-16 RX ADMIN — Medication 10 ML: at 17:09

## 2017-03-16 RX ADMIN — FAMOTIDINE 20 MG: 20 TABLET ORAL at 17:09

## 2017-03-16 RX ADMIN — INSULIN LISPRO 2 UNITS: 100 INJECTION, SOLUTION INTRAVENOUS; SUBCUTANEOUS at 12:00

## 2017-03-16 RX ADMIN — FAMOTIDINE 20 MG: 20 TABLET ORAL at 09:40

## 2017-03-16 RX ADMIN — LOSARTAN POTASSIUM 100 MG: 50 TABLET ORAL at 09:40

## 2017-03-16 RX ADMIN — ENOXAPARIN SODIUM 40 MG: 80 INJECTION SUBCUTANEOUS at 09:40

## 2017-03-16 RX ADMIN — BUDESONIDE 500 MCG: 0.5 INHALANT RESPIRATORY (INHALATION) at 08:09

## 2017-03-16 RX ADMIN — BUDESONIDE 500 MCG: 0.5 INHALANT RESPIRATORY (INHALATION) at 19:32

## 2017-03-16 NOTE — PROGRESS NOTES
Hospitalist Progress Note  Tasneem Caba MD  Office: [phone]        Date of Service:  3/16/2017  NAME:  Karen Saleh  :  1970  MRN:  [id number]      Admission Summary:   56-year-old white male with past medical history of type 2 diabetes mellitus, hypertension and morbid   obesity, who presented to theED with complaint of shortness of breath, dizziness,headache,blurred vision,  ringing in his ears,and \"body jerking,\" tremors. Initial recorded vital signs were blood pressure 123/121, heart   rate 90, respiratory rate 20, O2 saturation 80% on room air. He had elevated D-dimer of 0.91. The patient was placed on BiPAP in ED, so he was not able to obtain a nuclear medicine VQ scan or MRI brain in ED. CT of the head without contrast showed right cavernous sinus mass, for which MRI was recommended in order to evaluate for possible sella lesions. Interval history / Subjective:   Seen in IMCU room 418. No complaints,he did not wear BIPAP last 2 nights        Assessment & Plan:     Acute hypoxic hypercapnic respiratory failure,MAXX/OHS  -sec to underlying untreated maxx, tobacco abuse  -Weaned off bipap as of 3/13  -pulm recs apprec,low probability for PE and VQ d/c per pulmonary,lovenox switched to prophylactic dose.  -Agree with pulmonary he needs trilogy,patient now in agreement.  -Insurance declined Trilogy on grounds of previous non compliance with CPAP  -Sleep study scheduled for 3/23. Pulmonary help appreciated. -Needs homer oxygen on discharge  -Diuresis     Altered mental status. Resolved   -suspect co2 narcosis. Needed BIPAP he is now on NC  -CT of the head without contrast Right cavernous sinus mass    Right cavernous sinus mass  -noted as incidental finding on ct head as above  -Unable to do MRI as he is unable to lie flat  -NS okay to do this as out patient when he is more stable.  -3/14,attempted MRI brain    Elevated BNP (250)  -cxr no effusions, no pulm edema  -repeat bnp today normal at 24  -Echocardiogram with normal EF,no WMA      Generalized weakness and debility, Dizziness  -fall precautions. Type 2 diabetes mellitus., HbA1c 7.4  -SSI  -cont hold metformin,resume on discharge    Mildly Elevated troponin.   -in setting of resp distress. Initial trop 0.05 indeterminate range and repeat 0.04 and 0.04 normal. No indic of mi      Malignant hypertension,BP uncontrolled yet  -  -Cardene IV infusion titrated off on arrival to floor  -cont cozaar  -Added HCTZ on 3/13,now BP better  -CPAP would definitely help with his BP as well    Chronic bilateral lower extremity edema.   -Dopplers BLE neg for dvt    Tobacco abuse.   -Encourage smoking cessation. Morbid obesity  Body mass index is 47.84 kg/(m^2). -Diet and activity counseled  -Patient says he is limited by back   -He is advised on dietary measurers at least,he is asking for double portions and ordering food from outside.     3/16:Per patient request,I called and updated his mother . Phone #[phone]    Code status: full  DVT prophylaxis lovenox    Care Plan discussed with: Patient/Family and Nurse  Disposition: possible d/c 1-2 days     Hospital Problems  Date Reviewed: 3/10/2017          Codes Class Noted POA    * (Principal)Acute respiratory failure with hypoxia and hypercapnia (HCC) ICD-10-CM: J96.01, J96.02  ICD-9-CM: 518.81  3/10/2017 Unknown                Review of Systems:   A comprehensive review of systems was negative except for that written in the HPI. Vital Signs:    Last 24hrs VS reviewed since prior progress note.  Most recent are:  Visit Vitals    BP (!) 148/96    Pulse 94    Temp 98 °F (36.7 °C)    Resp 22    Ht 6' (1.829 m)    Wt (!) 160 kg (352 lb 11.8 oz)    SpO2 93%    BMI 47.84 kg/m2         Intake/Output Summary (Last 24 hours) at 03/16/17 1109  Last data filed at 03/15/17 2000   Gross per 24 hour   Intake              620 ml   Output 0 ml   Net              620 ml        Physical Examination:             Constitutional:  No acute distress, cooperative, pleasant    ENT:  Oral mucous moist, oropharynx benign. Neck supple,    Resp:  CTA bilaterally. No wheezing/rhonchi/rales. No accessory muscle use   CV:  Regular rhythm, normal rate, no murmurs, gallops, rubs    GI:  Soft, non distended, non tender. normoactive bowel sounds, no hepatosplenomegaly     Musculoskeletal:  No edema, warm, 2+ pulses throughout    Neurologic:  Moves all extremities. AAOx3, CN II-XII reviewed            Data Review:    Review and/or order of clinical lab test      Labs:     No results for input(s): WBC, HGB, HCT, PLT, HGBEXT, HCTEXT, PLTEXT, HGBEXT, HCTEXT, PLTEXT in the last 72 hours. No results for input(s): NA, K, CL, CO2, BUN, CREA, GLU, CA, MG, PHOS, URICA in the last 72 hours. No results for input(s): SGOT, GPT, ALT, AP, TBIL, TBILI, TP, ALB, GLOB, GGT, AML, LPSE in the last 72 hours. No lab exists for component: AMYP, HLPSE  No results for input(s): INR, PTP, APTT in the last 72 hours. No lab exists for component: INREXT, INREXT   No results for input(s): FE, TIBC, PSAT, FERR in the last 72 hours. No results found for: FOL, RBCF   No results for input(s): PH, PCO2, PO2 in the last 72 hours. No results for input(s): CPK, CKNDX, TROIQ in the last 72 hours.     No lab exists for component: CPKMB  Lab Results   Component Value Date/Time    Cholesterol, total 182 10/22/2011 04:40 AM    HDL Cholesterol 26 10/22/2011 04:40 AM    LDL, calculated 79.8 10/22/2011 04:40 AM    Triglyceride 381 10/22/2011 04:40 AM    CHOL/HDL Ratio 7.0 10/22/2011 04:40 AM     Lab Results   Component Value Date/Time    Glucose (POC) 121 03/16/2017 06:53 AM    Glucose (POC) 132 03/15/2017 09:13 PM    Glucose (POC) 182 03/15/2017 04:44 PM    Glucose (POC) 150 03/15/2017 12:01 PM    Glucose (POC) 130 03/15/2017 06:48 AM     Lab Results   Component Value Date/Time    Color YELLOW/STRAW 03/11/2017 06:00 AM    Appearance CLEAR 03/11/2017 06:00 AM    Specific gravity 1.009 03/11/2017 06:00 AM    Specific gravity >1.030 02/18/2010 09:20 PM    pH (UA) 5.5 03/11/2017 06:00 AM    Protein NEGATIVE  03/11/2017 06:00 AM    Glucose NEGATIVE  03/11/2017 06:00 AM    Ketone NEGATIVE  03/11/2017 06:00 AM    Bilirubin NEGATIVE  03/11/2017 06:00 AM    Urobilinogen 0.2 03/11/2017 06:00 AM    Nitrites NEGATIVE  03/11/2017 06:00 AM    Leukocyte Esterase NEGATIVE  03/11/2017 06:00 AM    Epithelial cells FEW 03/11/2017 06:00 AM    Bacteria NEGATIVE  03/11/2017 06:00 AM    WBC 0-4 03/11/2017 06:00 AM    RBC 0-5 03/11/2017 06:00 AM         Medications Reviewed:     Current Facility-Administered Medications   Medication Dose Route Frequency    bumetanide (BUMEX) tablet 1 mg  1 mg Oral ONCE    loratadine (CLARITIN) tablet 10 mg  10 mg Oral DAILY    albuterol-ipratropium (DUO-NEB) 2.5 MG-0.5 MG/3 ML  3 mL Nebulization BID RT    hydroCHLOROthiazide (HYDRODIURIL) tablet 25 mg  25 mg Oral DAILY    enoxaparin (LOVENOX) injection 40 mg  40 mg SubCUTAneous Q12H    nicotine (NICODERM CQ) 21 mg/24 hr patch 1 Patch  1 Patch TransDERmal DAILY    budesonide (PULMICORT) 500 mcg/2 ml nebulizer suspension  500 mcg Nebulization BID RT    famotidine (PEPCID) tablet 20 mg  20 mg Oral BID    losartan (COZAAR) tablet 100 mg  100 mg Oral DAILY    sodium chloride (NS) flush 5-10 mL  5-10 mL IntraVENous Q8H    sodium chloride (NS) flush 5-10 mL  5-10 mL IntraVENous PRN    glucose chewable tablet 16 g  4 Tab Oral PRN    dextrose (D50W) injection syrg 12.5-25 g  12.5-25 g IntraVENous PRN    glucagon (GLUCAGEN) injection 1 mg  1 mg IntraMUSCular PRN    insulin lispro (HUMALOG) injection   SubCUTAneous AC&HS     ______________________________________________________________________  EXPECTED LENGTH OF STAY: 3d 19h                 Chau Kwong MD

## 2017-03-16 NOTE — PROGRESS NOTES
Bedside shift change report given to CHICO Edwards(oncoming nurse) by Jessica Perez RN(offgoing nurse). Report included the following information SBAR, Kardex, ED Summary, Procedure Summary, Intake/Output, MAR, Accordion and Recent Results. Vital signs stable, call bell within reach, patient in no apparent distress. Hourly Rounding performed by CHICO.   Doyle Silverio RN

## 2017-03-16 NOTE — PROGRESS NOTES
UPDATE:  [facility] accepted patient. Left FYI on chart for attending to write order (HOME O2 under \"new order\"). Need to send order and sat testing to [address] [place] [address]. During rounds it was noted patient will need home O2. Spoke with patient to get DME company choice, and patient chose [facility]. Sent referral to [facility] to let them know patient will need home O2 at DC and the O2 sats are being done now and will be forwarded. Spoke with Unit RN about sat testing needed. Care Management Interventions  PCP Verified by CM: Yes  Last Visit to PCP: 02/07/17  Mode of Transport at Discharge:  Other (see comment) (family/friends)  Transition of Care Consult (CM Consult):  (home with home O2 via [facility])  Discharge Durable Medical Equipment: Yes (home O2 via [facility])  Physical Therapy Consult: No  Occupational Therapy Consult: No  Speech Therapy Consult: No  Current Support Network: Lives Alone  Freedom of Choice Offered: Yes  Discharge Location  Discharge Placement: Home

## 2017-03-16 NOTE — PROGRESS NOTES
Pulse Ox test:    1205: RA sats 87% at rest.  1208: NC 2L 90% at rest.  1215: NC 3L 93% at rest.  1220: NC 4L 95% at rest.

## 2017-03-16 NOTE — PROGRESS NOTES
Pulmonary, Critical Care, and Sleep Medicine~Progress Note    Name: James Galeano MRN: [id number]   : 1970 Hospital: . [address]   Date: 3/16/2017 10:25 AM Admission: 3/10/2017     IMPRESSION:   · Acute on chronic hypercapnic resp failure   · OHS/MAXX, noncompliant with CPAP outpt basis   · HFpEF with diastolic dysfunction   · Likely has obstructive lung disease; smokes 1.5 ppds   · Secondary PH  · Abnormal Head CT scan, cavernous sinus mass?? NS involved       PLAN:   · Insurance company will not be paying for a trilogy device. Had a long discussion with patient and he agreed to sleep appt with study to move towards bipap. I personally made him an appt with Dr Allyn Caro at our [facility] clinic at 1045am on thursday 3/23/17. They will call him to give him all the particulars. · Diuretic dosing daily, weight appears up from admission   · Nebs   · Dvt/pud proph  · Will need formal PFTs in 2-3 wks   · O2 titration above 90%      Daily Progression:    Weight down, lets keep it up. Breathing ok.  Still drops in O2 sats from time to time     OBJECTIVE:     Vital Signs:       Visit Vitals    BP (!) 148/96    Pulse 94    Temp 98 °F (36.7 °C)    Resp 22    Ht 6' (1.829 m)    Wt (!) 160 kg (352 lb 11.8 oz)    SpO2 93%    BMI 47.84 kg/m2      Temp (24hrs), Av.9 °F (36.6 °C), Min:97.7 °F (36.5 °C), Max:98.1 °F (36.7 °C)     Intake/Output:     Last shift:      Last 3 shifts:  1901 -  0700  In: 1240 [P.O.:1240]  Out: -           Intake/Output Summary (Last 24 hours) at 17 0948  Last data filed at 03/15/17 2000   Gross per 24 hour   Intake              620 ml   Output                0 ml   Net              620 ml       Ventilation:   Mode Rate Tidal Volume Pressure Suppport FiO2/LPM PEEP Other               Peak airway pressure: 14.1 cm H2O    Minute ventilation: 19.9 l/min    Trilogy:        Physical Exam: Exam Findings Other   General: No resp distress noted, appears stated age    [de-identified]:  No ulcers, JVD not elevated, no cervical LAD    Chest: No pectus deformity, normal chest rise b/l    HEART:  RRR, no murmurs/rubs/gallops    Lungs:  CTA b/l, no rhonchi/crackles/wheeze, diminished BS at bases    ABD: Soft/NT, non rigid mildly distended    EXT: No cyanosis/clubbing/edema, normal peripheral pulses    Skin: No rashes or ulcers, no mottling    Neuro: A/O x 3        Medications:  Current Facility-Administered Medications   Medication Dose Route Frequency    loratadine (CLARITIN) tablet 10 mg  10 mg Oral DAILY    albuterol-ipratropium (DUO-NEB) 2.5 MG-0.5 MG/3 ML  3 mL Nebulization BID RT    hydroCHLOROthiazide (HYDRODIURIL) tablet 25 mg  25 mg Oral DAILY    enoxaparin (LOVENOX) injection 40 mg  40 mg SubCUTAneous Q12H    nicotine (NICODERM CQ) 21 mg/24 hr patch 1 Patch  1 Patch TransDERmal DAILY    budesonide (PULMICORT) 500 mcg/2 ml nebulizer suspension  500 mcg Nebulization BID RT    famotidine (PEPCID) tablet 20 mg  20 mg Oral BID    losartan (COZAAR) tablet 100 mg  100 mg Oral DAILY    sodium chloride (NS) flush 5-10 mL  5-10 mL IntraVENous Q8H    sodium chloride (NS) flush 5-10 mL  5-10 mL IntraVENous PRN    glucose chewable tablet 16 g  4 Tab Oral PRN    dextrose (D50W) injection syrg 12.5-25 g  12.5-25 g IntraVENous PRN    glucagon (GLUCAGEN) injection 1 mg  1 mg IntraMUSCular PRN    insulin lispro (HUMALOG) injection   SubCUTAneous AC&HS       Labs:  ABG No results for input(s): PHI, PCO2I, PO2I, HCO3I, SO2I, FIO2I in the last 72 hours. CBC No results for input(s): WBC, HGB, HCT, PLT, MCV, MCH, HGBEXT, HCTEXT, PLTEXT, HGBEXT, HCTEXT, PLTEXT in the last 72 hours. Metabolic  Panel No results for input(s): NA, K, CL, CO2, GLU, BUN, CREA, CA, MG, PHOS, ALB, TBIL, SGOT, ALT, INR in the last 72 hours.     No lab exists for component: INREXT, INREXT     Pertinent Labs STONE Aguirre  3/16/2017

## 2017-03-17 ENCOUNTER — APPOINTMENT (OUTPATIENT)
Dept: NUCLEAR MEDICINE | Age: 47
DRG: 154 | End: 2017-03-17
Attending: HOSPITALIST
Payer: COMMERCIAL

## 2017-03-17 VITALS
OXYGEN SATURATION: 92 % | BODY MASS INDEX: 42.66 KG/M2 | WEIGHT: 315 LBS | HEIGHT: 72 IN | DIASTOLIC BLOOD PRESSURE: 72 MMHG | HEART RATE: 88 BPM | TEMPERATURE: 97.9 F | RESPIRATION RATE: 20 BRPM | SYSTOLIC BLOOD PRESSURE: 114 MMHG

## 2017-03-17 PROBLEM — J96.01 ACUTE RESPIRATORY FAILURE WITH HYPOXIA AND HYPERCAPNIA (HCC): Status: RESOLVED | Noted: 2017-03-10 | Resolved: 2017-03-17

## 2017-03-17 PROBLEM — J96.02 ACUTE RESPIRATORY FAILURE WITH HYPOXIA AND HYPERCAPNIA (HCC): Status: RESOLVED | Noted: 2017-03-10 | Resolved: 2017-03-17

## 2017-03-17 LAB
ANION GAP BLD CALC-SCNC: 5 MMOL/L (ref 5–15)
BUN SERPL-MCNC: 24 MG/DL (ref 6–20)
BUN/CREAT SERPL: 23 (ref 12–20)
CALCIUM SERPL-MCNC: 9.3 MG/DL (ref 8.5–10.1)
CHLORIDE SERPL-SCNC: 90 MMOL/L (ref 97–108)
CO2 SERPL-SCNC: 41 MMOL/L (ref 21–32)
CREAT SERPL-MCNC: 1.04 MG/DL (ref 0.7–1.3)
GLUCOSE BLD STRIP.AUTO-MCNC: 115 MG/DL (ref 65–100)
GLUCOSE BLD STRIP.AUTO-MCNC: 182 MG/DL (ref 65–100)
GLUCOSE SERPL-MCNC: 168 MG/DL (ref 65–100)
POTASSIUM SERPL-SCNC: 4.2 MMOL/L (ref 3.5–5.1)
SERVICE CMNT-IMP: ABNORMAL
SERVICE CMNT-IMP: ABNORMAL
SODIUM SERPL-SCNC: 136 MMOL/L (ref 136–145)

## 2017-03-17 PROCEDURE — 74011250637 HC RX REV CODE- 250/637: Performed by: HOSPITALIST

## 2017-03-17 PROCEDURE — 94640 AIRWAY INHALATION TREATMENT: CPT

## 2017-03-17 PROCEDURE — 74011250637 HC RX REV CODE- 250/637: Performed by: NURSE PRACTITIONER

## 2017-03-17 PROCEDURE — 80048 BASIC METABOLIC PNL TOTAL CA: CPT | Performed by: HOSPITALIST

## 2017-03-17 PROCEDURE — 74011000250 HC RX REV CODE- 250: Performed by: INTERNAL MEDICINE

## 2017-03-17 PROCEDURE — A9558 XE133 XENON 10MCI: HCPCS

## 2017-03-17 PROCEDURE — 74011250636 HC RX REV CODE- 250/636: Performed by: HOSPITALIST

## 2017-03-17 PROCEDURE — 36415 COLL VENOUS BLD VENIPUNCTURE: CPT | Performed by: HOSPITALIST

## 2017-03-17 PROCEDURE — 82962 GLUCOSE BLOOD TEST: CPT

## 2017-03-17 PROCEDURE — 74011000250 HC RX REV CODE- 250: Performed by: HOSPITALIST

## 2017-03-17 PROCEDURE — 74011250637 HC RX REV CODE- 250/637: Performed by: INTERNAL MEDICINE

## 2017-03-17 PROCEDURE — 74011250636 HC RX REV CODE- 250/636: Performed by: FAMILY MEDICINE

## 2017-03-17 RX ORDER — IBUPROFEN 200 MG
1 TABLET ORAL DAILY
Qty: 30 PATCH | Refills: 0 | Status: SHIPPED | OUTPATIENT
Start: 2017-03-17 | End: 2017-04-16

## 2017-03-17 RX ORDER — HYDROCHLOROTHIAZIDE 25 MG/1
25 TABLET ORAL DAILY
Qty: 30 TAB | Refills: 0 | Status: SHIPPED | OUTPATIENT
Start: 2017-03-17 | End: 2017-04-16

## 2017-03-17 RX ORDER — BUDESONIDE AND FORMOTEROL FUMARATE DIHYDRATE 160; 4.5 UG/1; UG/1
2 AEROSOL RESPIRATORY (INHALATION) 2 TIMES DAILY
Qty: 1 INHALER | Refills: 0 | Status: SHIPPED | OUTPATIENT
Start: 2017-03-17 | End: 2017-03-23 | Stop reason: ALTCHOICE

## 2017-03-17 RX ORDER — ALBUTEROL SULFATE 90 UG/1
1 AEROSOL, METERED RESPIRATORY (INHALATION)
Qty: 1 INHALER | Refills: 0 | Status: SHIPPED | OUTPATIENT
Start: 2017-03-17

## 2017-03-17 RX ADMIN — HYDROCHLOROTHIAZIDE 25 MG: 25 TABLET ORAL at 08:57

## 2017-03-17 RX ADMIN — FAMOTIDINE 20 MG: 20 TABLET ORAL at 08:57

## 2017-03-17 RX ADMIN — ENOXAPARIN SODIUM 40 MG: 80 INJECTION SUBCUTANEOUS at 10:05

## 2017-03-17 RX ADMIN — IPRATROPIUM BROMIDE AND ALBUTEROL SULFATE 3 ML: .5; 3 SOLUTION RESPIRATORY (INHALATION) at 07:55

## 2017-03-17 RX ADMIN — INSULIN LISPRO 2 UNITS: 100 INJECTION, SOLUTION INTRAVENOUS; SUBCUTANEOUS at 12:31

## 2017-03-17 RX ADMIN — LOSARTAN POTASSIUM 100 MG: 50 TABLET ORAL at 08:56

## 2017-03-17 RX ADMIN — Medication 10 ML: at 06:00

## 2017-03-17 RX ADMIN — BUDESONIDE 500 MCG: 0.5 INHALANT RESPIRATORY (INHALATION) at 07:55

## 2017-03-17 RX ADMIN — Medication 5 ML: at 14:00

## 2017-03-17 RX ADMIN — LORATADINE 10 MG: 10 TABLET ORAL at 08:57

## 2017-03-17 NOTE — PROGRESS NOTES
UPDATE:  Gerald delivered temp tank to patient's room today. Patient should be discharged later today. Received O2 order from physician and sent it along with sats testing to [facility] via [employer].

## 2017-03-17 NOTE — DISCHARGE SUMMARY
Discharge Summary       PATIENT ID: Janice Gold  MRN: [id number]   YOB: 1970    DATE OF ADMISSION: 3/10/2017  3:01 PM    DATE OF DISCHARGE: 3/17/2017  PRIMARY CARE PROVIDER: Fredis Oseguera MD     ATTENDING PHYSICIAN: Bogdan Dye MD  DISCHARGING PROVIDER: Bogdan Dye MD    To contact this individual call [phone] and ask the  to page. If unavailable ask to be transferred the Adult Hospitalist Department. CONSULTATIONS: IP CONSULT TO PULMONOLOGY    PROCEDURES/SURGERIES: * No surgery found *    ADMITTING [address] [place] [address] COURSE:        Admission Summary:   45-year-old white male with past medical history of type 2 diabetes mellitus, hypertension and morbid   obesity, who presented to theED with complaint of shortness of breath, dizziness,headache,blurred vision,  ringing in his ears,and \"body jerking,\" tremors. Initial recorded vital signs were blood pressure 123/121, heart   rate 90, respiratory rate 20, O2 saturation 80% on room air. He had elevated D-dimer of 0.91. The patient was placed on BiPAP in ED, so he was not able to obtain a nuclear medicine VQ scan or MRI brain in ED. CT of the head without contrast showed right cavernous sinus mass, for which MRI was recommended in order to evaluate for possible sella lesions. Assessment & Plan:    Acute hypoxic hypercapnic respiratory failure,MAXX/OHS  -sec to underlying untreated maxx, tobacco abuse  -Weaned off bipap as of 3/13  -pulm recs apprec,low probability for PE and VQ d/c per pulmonary,lovenox switched to prophylactic dose.  -Agree with pulmonary he needs trilogy,patient now in agreement.  -Insurance declined Trilogy on grounds of previous non compliance with CPAP  -Sleep study scheduled for 3/23. Pulmonary help appreciated. -Home with home oxygen.  has set this up prior to discharge. Altered mental status. Resolved   -suspect co2 narcosis. Needed BIPAP he is now on NC  -CT of the head without contrast Right cavernous sinus mass     Right cavernous sinus mass  -noted as incidental finding on ct head as above  -Unable to do MRI as he is unable to lie flat  -NS okay to do this as out patient when he is more stable.  -Attempted MRI x2 unable to lie flat to complete  -NS recommended out patient follow up and MRI can be done as out patient. Elevated BNP (788)  -cxr no effusions, no pulm edema  -repeat bnp today normal at 24  -Echocardiogram with normal EF,no WMA  -Added HCTZ for he has uncontrolled HTN as well. Generalized weakness and debility, Dizziness          Type 2 diabetes mellitus., HbA1c 7.4  -resume metformin    Mildly Elevated troponin.   -in setting of resp distress. Initial trop 0.05 indeterminate range and repeat 0.04 and 0.04 normal. No indic of mi        Malignant hypertension,better with addition of hctz. Chronic bilateral lower extremity edema.   -Dopplers BLE neg for dvt     Tobacco abuse.   -Encourage smoking cessation. Morbid obesity  Body mass index is 47.84 kg/(m^2). -Diet and activity counseled  -Patient says he is limited by back   -He is advised on dietary measurers at least,he is asking for double portions and ordering food from outside. 3/16:Per patient request,I called and updated his mother . Phone [phone] / PLAN:      See above       PENDING TEST RESULTS:   At the time of discharge the following test results are still pending: none    FOLLOW UP APPOINTMENTS:    Follow-up Information     Follow up With Details Comments Contact Info    Susana Warren MD Go on 3/23/2017 1045 am for consultation [address] [facility] [address] [facility]      Susana Warren MD On 4/2/2017 at 8:30 pm for sleep study [address] [facility]  [address] [facility]      Reynold Pete MD   [address]      Arline Meigs, MD  Call office for appointment [address]  [phone]             ADDITIONAL CARE RECOMMENDATIONS:  · Take all medications as prescribed. Take list of medications or bottle of medications for your doctor's visit. · Keep you appointment with the above providers. You were found to have a tumor in the brain on the head CT scan. Neurosurgery recommended follow up and brain MRI as out patient. You were unable to complete the brain MRI here. · You need sleep study,we anticipate you will qualify for CPAP or BIPAP. It is very important you follow thorough this. DIET: Cardiac Diet    ACTIVITY: Activity as tolerated    WOUND CARE: NA    EQUIPMENT needed: NA      DISCHARGE MEDICATIONS:  Current Discharge Medication List      START taking these medications    Details   hydroCHLOROthiazide (HYDRODIURIL) 25 mg tablet Take 1 Tab by mouth daily for 30 days. Qty: 30 Tab, Refills: 0      budesonide-formoterol (SYMBICORT) 160-4.5 mcg/actuation HFA inhaler Take 2 Puffs by inhalation two (2) times a day. Qty: 1 Inhaler, Refills: 0      albuterol (PROVENTIL HFA, VENTOLIN HFA, PROAIR HFA) 90 mcg/actuation inhaler Take 1 Puff by inhalation every six (6) hours as needed for Wheezing. Qty: 1 Inhaler, Refills: 0         CONTINUE these medications which have NOT CHANGED    Details   metFORMIN (GLUCOPHAGE) 1,000 mg tablet Take 1 Tab by mouth two (2) times daily (with meals). Qty: 60 Tab, Refills: 12      losartan (COZAAR) 100 mg tablet TAKE 1 TABLET BY MOUTH EVERY DAY(NON PAYMENT OF INS)  Qty: 353 Tab, Refills: 0               NOTIFY YOUR PHYSICIAN FOR ANY OF THE FOLLOWING:   Fever over 101 degrees for 24 hours. Chest pain, shortness of breath, fever, chills, nausea, vomiting, diarrhea, change in mentation, falling, weakness, bleeding. Severe pain or pain not relieved by medications. Or, any other signs or symptoms that you may have questions about.     DISPOSITION:   x Home With:home oxygen   OT  PT  HH  RN       Long term SNF/Inpatient Rehab    Independent/assisted living    Hospice    Other:       PATIENT CONDITION AT DISCHARGE:     Functional status    Poor     Deconditioned    x Independent      Cognition    x Lucid     Forgetful     Dementia      Catheters/lines (plus indication)    [place]     PICC     PEG    x None      Code status     Full code     DNR      PHYSICAL EXAMINATION AT DISCHARGE:     Visit Vitals    /62 (BP 1 Location: Right arm, BP Patient Position: At rest;Lying left side)    Pulse 79    Temp 97.7 °F (36.5 °C)    Resp 20    Ht 6' (1.829 m)    Wt 156.4 kg (344 lb 12.8 oz)    SpO2 94%    BMI 46.76 kg/m2    O2 Flow Rate (L/min): 4 l/min O2 Device: Nasal cannula    Temp (24hrs), Av °F (36.7 °C), Min:97.7 °F (36.5 °C), Max:98.2 °F (36.8 °C)     07 -  1900  In: 420 [P.O.:420]  Out: -    03/15 190 -  0700  In: 1480 [P.O.:1480]  Out: -         Constitutional:  No acute distress, cooperative, pleasant    ENT:  Oral mucous moist, oropharynx benign. Neck supple,    Resp:  CTA bilaterally. No wheezing/rhonchi/rales. No accessory muscle use   CV:  Regular rhythm, normal rate, no murmurs, gallops, rubs    GI:  Soft, non distended, non tender. normoactive bowel sounds, no hepatosplenomegaly     Musculoskeletal:  No edema, warm, 2+ pulses throughout    Neurologic:  Moves all extremities.   AAOx3, CN II-XII reviewed                CHRONIC MEDICAL DIAGNOSES:  Problem List as of 3/17/2017  Date Reviewed: 3/10/2017          Codes Class Noted - Resolved    * (Principal)RESOLVED: Acute respiratory failure with hypoxia and hypercapnia (HCC) ICD-10-CM: J96.01, J96.02  ICD-9-CM: 518.81  3/10/2017 - 3/17/2017        RESOLVED: MAXX (obstructive sleep apnea) ICD-10-CM: Y42.49  ICD-9-CM: 327.23  2015 - 3/17/2017        RESOLVED: Dyslipidemia (high LDL; low HDL) ICD-10-CM: E78.4  ICD-9-CM: 272.4  10/22/2011 - 3/17/2017        RESOLVED: Obesity, morbid (more than 100 lbs over ideal weight or BMI > 40) ([facility] [address] ICD-10-CM: E66.01  ICD-9-CM: 278.01  10/22/2011 - 3/17/2017        RESOLVED: Hyperosmolarity due to secondary diabetes ([facility] ICD-10-CM: E13.00  ICD-9-CM: 249.20  10/21/2011 - 3/17/2017        RESOLVED: Chest pain, unspecified ICD-10-CM: R07.9  ICD-9-CM: 786.50  10/21/2011 - 3/17/2017        RESOLVED: Benign essential hypertension ICD-10-CM: I10  ICD-9-CM: 401.1  10/21/2011 - 3/17/2017                  Signed:    Lisa Caban MD  3/17/2017  11:02 AM

## 2017-03-17 NOTE — PROGRESS NOTES
Pulmonary, Critical Care, and Sleep Medicine~Progress Note    Name: Oscar Belle MRN: [id number]   : 1970 Hospital: [facility]   Date: 3/17/2017 10:25 AM Admission: 3/10/2017     IMPRESSION:   · Acute on chronic hypercapnic resp failure   · OHS/MAXX, noncompliant with CPAP outpt basis   · HFpEF with diastolic dysfunction   · Likely has obstructive lung disease; smokes 1.5 ppds   · Secondary PH  · Abnormal Head CT scan, cavernous sinus mass?? NS involved       PLAN:   · appt with Dr Zeb Emery at our [facility] at 1045am on thursday 3/23/17. They have contacted him. · Diuretic dosing daily, weight appears up from admission   · Nebs   · Dvt/pud proph  · Will need formal PFTs in 2-3 wks   · O2 titration above 90%; encourage nose breathing. He is a mouth breather   · Doing ok from a pulmonary aspect. · PRN over the weekend      Daily Progression:    Weight down, lets keep it up. Breathing ok.  Still drops in O2 sats from time to time     OBJECTIVE:     Vital Signs:       Visit Vitals    /62 (BP 1 Location: Right arm, BP Patient Position: At rest;Lying left side)    Pulse 79    Temp 97.7 °F (36.5 °C)    Resp 20    Ht 6' (1.829 m)    Wt 156.4 kg (344 lb 12.8 oz)    SpO2 94%    BMI 46.76 kg/m2      Temp (24hrs), Av °F (36.7 °C), Min:97.7 °F (36.5 °C), Max:98.2 °F (36.8 °C)     Intake/Output:     Last shift:  07 - 1900  In: 420 [P.O.:420]  Out: -     Last 3 shifts: 03/15 1901 -  0700  In: 1480 [P.O.:1480]  Out: -           Intake/Output Summary (Last 24 hours) at 17 1036  Last data filed at 17 0900   Gross per 24 hour   Intake             1540 ml   Output                0 ml   Net             1540 ml       Ventilation:   Mode Rate Tidal Volume Pressure Suppport FiO2/LPM PEEP Other               Peak airway pressure: 14.1 cm H2O    Minute ventilation: 19.9 l/min    Trilogy: Physical Exam:                                        Exam Findings Other   General: No resp distress noted, appears stated age    [de-identified]:  No ulcers, JVD not elevated, no cervical LAD    Chest: No pectus deformity, normal chest rise b/l    HEART:  RRR, no murmurs/rubs/gallops    Lungs:  CTA b/l, no rhonchi/crackles/wheeze, diminished BS at bases    ABD: Soft/NT, non rigid mildly distended    EXT: No cyanosis/clubbing/edema, normal peripheral pulses    Skin: No rashes or ulcers, no mottling    Neuro: A/O x 3        Medications:  Current Facility-Administered Medications   Medication Dose Route Frequency    loratadine (CLARITIN) tablet 10 mg  10 mg Oral DAILY    albuterol-ipratropium (DUO-NEB) 2.5 MG-0.5 MG/3 ML  3 mL Nebulization BID RT    hydroCHLOROthiazide (HYDRODIURIL) tablet 25 mg  25 mg Oral DAILY    enoxaparin (LOVENOX) injection 40 mg  40 mg SubCUTAneous Q12H    nicotine (NICODERM CQ) 21 mg/24 hr patch 1 Patch  1 Patch TransDERmal DAILY    budesonide (PULMICORT) 500 mcg/2 ml nebulizer suspension  500 mcg Nebulization BID RT    famotidine (PEPCID) tablet 20 mg  20 mg Oral BID    losartan (COZAAR) tablet 100 mg  100 mg Oral DAILY    sodium chloride (NS) flush 5-10 mL  5-10 mL IntraVENous Q8H    sodium chloride (NS) flush 5-10 mL  5-10 mL IntraVENous PRN    glucose chewable tablet 16 g  4 Tab Oral PRN    dextrose (D50W) injection syrg 12.5-25 g  12.5-25 g IntraVENous PRN    glucagon (GLUCAGEN) injection 1 mg  1 mg IntraMUSCular PRN    insulin lispro (HUMALOG) injection   SubCUTAneous AC&HS       Labs:  ABG No results for input(s): PHI, PCO2I, PO2I, HCO3I, SO2I, FIO2I in the last 72 hours. CBC No results for input(s): WBC, HGB, HCT, PLT, MCV, MCH, HGBEXT, HCTEXT, PLTEXT, HGBEXT, HCTEXT, PLTEXT in the last 72 hours.      Metabolic  Panel Recent Labs      03/17/17   0421   NA  136   K  4.2   CL  90*   CO2  41*   GLU  168*   BUN  24*   CREA  1.04   CA  9.3        Pertinent Labs STONE Eduardo  3/17/2017

## 2017-03-17 NOTE — PROGRESS NOTES
Significant hypoxia at rest and worse on exertion. Hold discharge. CTA lung to r/o acute processes such as PE. PE was entertained on admission,pulmonary dc'd CTA lung as they did not feel he had PE at that time. Dye allergy! ! D/c CTA L.will do V/Q scan      V/Q came back with no v/q mismatch,low probability for PE. He is approved for home oxygen 3 lpm at rest 5 lpm with activity. Discharged.

## 2017-03-17 NOTE — PROGRESS NOTES
NUTRITION       Rescreen. Chart reviewed, discussed with RN and team during interdisciplinary rounds. Pt to be discharged today once O2 set up and delivered. Intake has been 100% of meals. No nutrition risk at this time. Will rescreen as indicated.     Patient Vitals for the past 100 hrs:   % Diet Eaten   03/17/17 0900 100 %   03/16/17 1923 100 %   03/16/17 1200 100 %   03/16/17 0938 100 %   03/15/17 1359 100 %   03/15/17 0830 100 %   03/14/17 1220 100 %   03/14/17 1036 100 %   03/13/17 0857 100 %     [address]

## 2017-03-17 NOTE — DISCHARGE INSTRUCTIONS
Discharge Instructions       PATIENT ID: Reece Morales  MRN: [id number]   YOB: 1970    DATE OF ADMISSION: 3/10/2017  3:01 PM    DATE OF DISCHARGE: 3/17/2017    PRIMARY CARE PROVIDER: Iftikhar Britton MD     ATTENDING PHYSICIAN: Daxa Malik MD  DISCHARGING PROVIDER: Daxa Malik MD    To contact this individual call [phone] and ask the  to page. If unavailable ask to be transferred the Adult Hospitalist Department. DISCHARGE DIAGNOSES   Respiratory failure  Obstructive sleep apnea  Hypertension    CONSULTATIONS: IP CONSULT TO PULMONOLOGY    PROCEDURES/SURGERIES: * No surgery found *    PENDING TEST RESULTS:   At the time of discharge the following test results are still pending: none    FOLLOW UP APPOINTMENTS:   Follow-up Information     Follow up With Details Comments Contact Info    Mabel London MD Go on 3/23/2017 1045 am for consultation [facility]      Mabel London MD On 4/2/2017 at 8:30 pm for sleep study [address] [place] [address]      Iftikhar Britton MD   [facility] [address]      Nirmal Vegas MD  Call office for appointment [address]  [phone]             ADDITIONAL CARE RECOMMENDATIONS:  · Take all medications as prescribed. Take list of medications or bottle of medications for your doctor's visit. · Keep you appointment with the above providers. You were found to have a tumor in the brain on the head CT scan. Neurosurgery recommended follow up and brain MRI as out patient. You were unable to complete the brain MRI here. · You need sleep study,we anticipate you will qualify for CPAP or BIPAP. It is very important you follow thorough this.        DIET: Cardiac Diet    ACTIVITY: Activity as tolerated    WOUND CARE: NA    EQUIPMENT needed: NA      DISCHARGE MEDICATIONS:   See Medication Reconciliation Form    · It is important that you take the medication exactly as they are prescribed. · Keep your medication in the bottles provided by the pharmacist and keep a list of the medication names, dosages, and times to be taken in your wallet. · Do not take other medications without consulting your doctor. NOTIFY YOUR PHYSICIAN FOR ANY OF THE FOLLOWING:   Fever over 101 degrees for 24 hours. Chest pain, shortness of breath, fever, chills, nausea, vomiting, diarrhea, change in mentation, falling, weakness, bleeding. Severe pain or pain not relieved by medications. Or, any other signs or symptoms that you may have questions about. DISPOSITION:   x Home With:   OT  PT  HH  RN       SNF/Inpatient Rehab/LTAC    Independent/assisted living    Hospice    Other:       Signed: Fara Leon MD  3/17/2017  10:53 AM        Learning About Obesity  What is obesity? Obesity means having so much body fat that your health is in danger. Having too much body fat can lead to type 2 diabetes, heart disease, high blood pressure, arthritis, sleep apnea, and stroke. Even if you don't feel bad now, think about these health risks. Do they seem like a good reason to start on a new path toward a healthier weight? Or do you have another personal, powerful reason for wanting to lose weight? Whatever it is, keep it in mind. It can be hard to change eating habits and exercise habits. But with your own reason and plan, you can do it. How do you know if your weight is in the obesity range? To know if your weight is in the obesity range, your doctor looks at your body mass index (BMI) and waist size. Your BMI is a number that is calculated from your weight and your height. To figure your BMI for yourself, get a BMI table from your doctor or use an online tool, such as http://www.barron.com/ on the [employer] of [place]. What causes obesity?   When you take in more calories than you burn off, you gain weight. How you eat, how active you are, and other things affect how your body uses calories and whether you gain weight. If you have family members who have too much body fat, you may have inherited a tendency to gain weight. And your family also helps form your eating and lifestyle habits, which can lead to obesity. Also, our busy lives make it harder to plan and cook healthy meals. For many of us, it's easier to reach for prepared foods, go out to eat, or go to the drive-through. But these foods are often high in saturated fat and calories. Portions are often too large. What can you do to reach a healthy weight? Focus on health, not diets. Diets are hard to stay on and don't work in the long run. It is very hard to stay with a diet that includes lots of big changes in your eating habits. Instead of a diet, focus on lifestyle changes that will improve your health and achieve the right balance of energy and calories. To lose weight, you need to burn more calories than you take in. You can do it by eating healthy foods in reasonable amounts and becoming more active, even a little bit every day. Making small changes over time can add up to a lot. Make a plan for change. Many people have found that naming their reasons for change and staying focused on their plan can make a big difference. Work with your doctor to create a plan that is right for you. · Ask yourself: Veronika [place] are my personal, most powerful reasons for wanting this change? What will my life look like when I've made the change? \"  · Set your long-term goal. Make it specific, such as \"I will lose x pounds. \"  · Break your long-term goal into smaller, short-term goals. Make these small steps specific and within your reach, things you know you can do. These steps are what keep you going from day to day. How can you stay on your plan for change? Be ready.  Choose to start during a time when there are few events that might trigger slip-ups, like holidays, social events, and high-stress periods. Decide on your first few steps. Most people have more success when they make small changes, one step at a time. For example, you might switch a daily candy bar to a piece of fruit, walk 10 minutes more, or add more vegetables to a meal.  Line up your support people. Make sure you're not going to be alone as you make this change. Connect with people who understand how important it is to you. Ask family members and friends for help in keeping with your plan. And think about who could make it harder for you, and how to handle them. Try tracking. People who keep track of what they eat, feel, and do are better at losing weight. Try writing down things like:  · What and how much you eat. · How you feel before and after each meal.  · Details about each meal (like eating out or at home, eating alone, or with friends or family). · What you do to be active. Look and plan. As you track, look for patterns that you may want to change. Take note of:  · When you eat and whether you skip meals. · How often you eat out. · How many fruits and vegetables you eat. · When you eat beyond feeling full. · When and why you eat for reasons other than being hungry. When you stray from your plan, don't get upset. Figure out what made you slip up and how you can fix it. Can you take medicines or have surgery to lose weight? Before your doctor will prescribe medicines or surgery, he or she will probably want you to be more active and follow your healthy eating plan for a period of time. These habits are key lifelong changes for managing your weight, with or without other medical treatment. And these changes can help you avoid weight-related health problems. Follow-up care is a key part of your treatment and safety. Be sure to make and go to all appointments, and call your doctor if you are having problems.  It's also a good idea to know your test results and keep a list of the medicines you take. Where can you learn more? Go to http://keeley-kavin.info/. Enter N111 in the search box to learn more about \"Learning About Obesity. \"  Current as of: February 16, 2016  Content Version: 11.1  © 3318-8442 [employer], [employer]. Care instructions adapted under license by [address] (which disclaims liability or warranty for this information).  If you have questions about a medical condition or this instruction, always ask your healthcare professional. [address] any warranty or liability for your use of this information.

## 2017-03-17 NOTE — PROGRESS NOTES
I have reviewed discharge instructions with the patient. The patient verbalized understanding. Education on albuterol, Symbicort, hydrochlorothiazide and nicotine given. Patient unable to afford Symbicort inhaler, coupon given for 12 free inhalers. Opportunities given for questions. PIV removed. Emphasized importance of smoking cessation.

## 2017-03-17 NOTE — PROGRESS NOTES
Problem: Breathing Pattern - Ineffective  Goal: *Absence of hypoxia  Outcome: Progressing Towards Goal  Pt on 3-4L NC, pt tolerating well. Pt to be discharged today. CM setting up home O2, portable tank to be delivered to pt.     1115: Sitting in chair on 1 L  NC pt O2 87%. Pt sitting in chair O2 945 on 3L NC. Pt ambulated around hallway on 3L NC. Pt O2 94% sitting in chair on 3L, ambulating pt O2 decreased to 83%, unable to bring O2 stats above 90% with deep breathing and an increase in O2 to 4L, O2 only increased to 85% on 4L while ambualting. O2 increased to 5L in order to increase pt O2 above 90%. Pt continued to ambulate on 5L NC and O2 maintained 89% and above on 5L.

## 2017-03-20 ENCOUNTER — PATIENT OUTREACH (OUTPATIENT)
Dept: INTERNAL MEDICINE CLINIC | Age: 47
End: 2017-03-20

## 2017-03-20 ENCOUNTER — HOME HEALTH ADMISSION (OUTPATIENT)
Dept: HOME HEALTH SERVICES | Facility: HOME HEALTH | Age: 47
End: 2017-03-20
Payer: COMMERCIAL

## 2017-03-20 DIAGNOSIS — M51.06 INTERVERTEBRAL LUMBAR DISC DISORDER WITH MYELOPATHY, LUMBAR REGION: ICD-10-CM

## 2017-03-20 DIAGNOSIS — J96.01 ACUTE RESPIRATORY FAILURE WITH HYPOXIA AND HYPERCAPNIA (HCC): ICD-10-CM

## 2017-03-20 DIAGNOSIS — E11.9 DIABETES MELLITUS WITHOUT COMPLICATION (HCC): Primary | ICD-10-CM

## 2017-03-20 DIAGNOSIS — J96.02 ACUTE RESPIRATORY FAILURE WITH HYPOXIA AND HYPERCAPNIA (HCC): ICD-10-CM

## 2017-03-20 NOTE — PROGRESS NOTES
[facility] Discharge Follow-Up      Date/Time:  3/20/2017 11:25 AM    Patient listed on discharge [facility]) report on   Patient discharged from [facility] for Hypoxia. Elizabeth Cuevas RRAT score: 8 Moderate    Medical History:     Past Medical History:   Diagnosis Date    Diabetes ([address]     type 2    Hypertension      [facility] record has been reviewed. Contacted [employer] by telephone to inquire about non-emergent medical transportation benefit and to verify that Dr. Esthela Ray is a par provider. The result of the contact was that Dr. Roberto Carlos Rome is an in-network provider and that the patient does not have a medical transportation benefit. Nurse Navigator(NN) contacted the patient by telephone to perform post hospital discharge assessment. Verified  and address with patient as identifiers. Provided introduction to self, and explanation of the Nurses Navigator role. The patient states that he is feeling better today. He reports minimal SOB while ambulating around his home. Medication:   Performed medication reconciliation with patient, and patient verbalizes understanding of administration of home medications. The patient states that he has a coupon to obtain the Symbicort at no co-pay for 12 months. He is going to use that today. The patient was concerned about only receiving a partial fill of 15 out of 30 doses for the nicotine patch. Navigator instructed the patient  To contact the [facility] [place] pharmacy and request for the prescriptions to be sent to the pharmacy of his choice for the balance of the medication. He agrees to do so. DME:  The patient states that his home O2 is being supplied by [employer]. He has an oxygen compressor and 4 small sized travel tanks at his home. Discharge Instructions :  Reviewed discharge instructions with patient. Reinforced smoking cessation d/t home O2 use. Patient verbalizes understanding of discharge instructions and follow-up care.  He is agreeable for a home nurse to visit for disease management education and assessment and home PT/OT for a safety evaluation. He plans to take the [facility] bus to his PCP and pulmonary appointments. Red Flags:  New or increased SOB/WILDER     PCP/Specialist follow up: Patient scheduled to follow up with Migue Morris MD on [id number]. The initial appointment to Pulmonary Dr. Lani Wilson was rescheduled for insurance reasons to [id number]. Reviewed red flags with patient, and patient verbalizes understanding. Patient given an opportunity to ask questions. No other clinical/social/functional needs noted. The patient agrees to contact the PCP office for questions related to their healthcare. The patient expressed thanks, offered no additional questions and ended the call. Case management impression/plan:  The patient appears to be motivated to improving his health at this time. He is very concerned about being able to return to work without supplemental O2 because he works in a kitchen near open flames. Will attempt to contact the patient by telephone or during office visit within the next 7-10 days. Will continue to follow as necessary for the next 30 days. Will reassess for case management needs prior to discharge from case management service on or about 30 days.

## 2017-03-21 ENCOUNTER — HOME CARE VISIT (OUTPATIENT)
Dept: SCHEDULING | Facility: HOME HEALTH | Age: 47
End: 2017-03-21
Payer: COMMERCIAL

## 2017-03-21 VITALS
RESPIRATION RATE: 20 BRPM | HEART RATE: 75 BPM | WEIGHT: 315 LBS | HEIGHT: 72 IN | OXYGEN SATURATION: 96 % | DIASTOLIC BLOOD PRESSURE: 87 MMHG | SYSTOLIC BLOOD PRESSURE: 145 MMHG | BODY MASS INDEX: 42.66 KG/M2 | TEMPERATURE: 98.2 F

## 2017-03-21 PROCEDURE — G0151 HHCP-SERV OF PT,EA 15 MIN: HCPCS

## 2017-03-23 ENCOUNTER — OFFICE VISIT (OUTPATIENT)
Dept: INTERNAL MEDICINE CLINIC | Age: 47
End: 2017-03-23

## 2017-03-23 VITALS
TEMPERATURE: 97.8 F | DIASTOLIC BLOOD PRESSURE: 90 MMHG | OXYGEN SATURATION: 95 % | WEIGHT: 315 LBS | BODY MASS INDEX: 42.66 KG/M2 | HEIGHT: 72 IN | RESPIRATION RATE: 16 BRPM | HEART RATE: 88 BPM | SYSTOLIC BLOOD PRESSURE: 140 MMHG

## 2017-03-23 DIAGNOSIS — J44.1 CHRONIC OBSTRUCTIVE PULMONARY DISEASE WITH ACUTE EXACERBATION (HCC): ICD-10-CM

## 2017-03-23 DIAGNOSIS — E11.9 CONTROLLED TYPE 2 DIABETES MELLITUS WITHOUT COMPLICATION, WITHOUT LONG-TERM CURRENT USE OF INSULIN (HCC): Primary | ICD-10-CM

## 2017-03-23 LAB — GLUCOSE POC: 301 MG/DL

## 2017-03-23 NOTE — MR AVS SNAPSHOT
Visit Information Date & Time Provider Department Dept. Phone Encounter #  
 3/23/2017 10:30 AM Benny Espinoza MD [facility] [phone] [id number] Follow-up Instructions Return in about 4 weeks (around 4/20/2017), or if symptoms worsen or fail to improve. Follow-up and Disposition History Your Appointments 4/24/2017 12:00 PM  
ROUTINE CARE with Benny Espinoza MD  
[facility] [address] [place] [address] ([facility]) Appt Note: 1mo fu  
 [address] [facility],[address] [facility] [id number]  
[phone]  
  
   
 [address] [place] [address],[address] [facility] [id number] Upcoming Health Maintenance Date Due  
 EYE EXAM RETINAL OR DILATED Q1 2/20/1981 MICROALBUMIN Q1 5/31/2017 FOOT EXAM Q1 6/28/2017 HEMOGLOBIN A1C Q6M 9/10/2017 LIPID PANEL Q1 1/5/2018 DTaP/Tdap/Td series (2 - Td) 12/15/2020 Allergies as of 3/23/2017  Review Complete On: 3/23/2017 By: Benny Espinoza MD  
  
 Severity Noted Reaction Type Reactions Iodine  10/21/2011    Hives Current Immunizations  Reviewed on 3/13/2017 Name Date DTaP 12/15/2010 Not reviewed this visit You Were Diagnosed With   
  
 Codes Comments Controlled type 2 diabetes mellitus without complication, without long-term current use of insulin ([facility]    -  Primary ICD-10-CM: E11.9 ICD-9-CM: 250.00 Chronic obstructive pulmonary disease with acute exacerbation (HCC)     ICD-10-CM: J44.1 ICD-9-CM: 491.21 Vitals BP Pulse Temp Resp Height(growth percentile) Weight(growth percentile) 140/90 88 97.8 °F (36.6 °C) (Oral) 16 6' (1.829 m) (!) 354 lb (160.6 kg) SpO2 BMI Smoking Status 95% 48.01 kg/m2 Current Every Day Smoker Vitals History BMI and BSA Data Body Mass Index Body Surface Area 48.01 kg/m 2 2.86 m 2 Preferred Pharmacy Pharmacy Name Phone [facility]/PHARMACY #[id number] [id number] [facility] AT [address] [place] [address] [phone] Your Updated Medication List  
  
   
This list is accurate as of: 3/23/17 11:59 PM.  Always use your most recent med list.  
  
  
  
  
 albuterol 90 mcg/actuation inhaler Commonly known as:  PROVENTIL HFA, VENTOLIN HFA, PROAIR HFA Take 1 Puff by inhalation every six (6) hours as needed for Wheezing. ALEVE 220 mg Cap Generic drug:  naproxen sodium Take 1 Cap by mouth every twelve (12) hours as needed (Back Pain). hydroCHLOROthiazide 25 mg tablet Commonly known as:  HYDRODIURIL Take 1 Tab by mouth daily for 30 days. * losartan 100 mg tablet Commonly known as:  COZAAR  
TAKE 1 TABLET BY MOUTH EVERY DAY(NON PAYMENT OF INS) * losartan 100 mg tablet Commonly known as:  COZAAR Take 100 mg by mouth daily. metFORMIN 1,000 mg tablet Commonly known as:  GLUCOPHAGE Take 1 Tab by mouth two (2) times daily (with meals). nicotine 21 mg/24 hr  
Commonly known as:  NICODERM CQ  
1 Patch by TransDERmal route daily for 30 days. OXYGEN-AIR DELIVERY SYSTEMS  
3 L/min by Both Nostrils route continuous. umeclidinium-vilanterol 62.5-25 mcg/actuation inhaler Commonly known as:  Mars Kimberly Take 1 Puff by inhalation daily. * Notice: This list has 2 medication(s) that are the same as other medications prescribed for you. Read the directions carefully, and ask your doctor or other care provider to review them with you. Prescriptions Sent to Pharmacy Refills  
 umeclidinium-vilanterol (ANORO ELLIPTA) 62.5-25 mcg/actuation inhaler 12 Sig: Take 1 Puff by inhalation daily. Class: Normal  
 Pharmacy: [facility]/pharmacy #[id number] [id number] [facility] AT [address] [place] [address] Ph #: [phone] Route: Inhalation We Performed the Following AMB POC GLUCOSE BLOOD, BY GLUCOSE MONITORING DEVICE [12096 CPT(R)] REFERRAL TO PULMONARY DISEASE [HKR55 Custom] Follow-up Instructions Return in about 4 weeks (around 2017), or if symptoms worsen or fail to improve. To-Do List   
 2017 To Be Determined Appointment with Mynor Bhatia at [facility] Referral Information Referral ID Referred By Referred To  
  
 [id number] Debbie Baker [facility] [address] [place] [address] [facility] Visits Status Start Date End Date 1 New Request 3/23/17 3/23/18 If your referral has a status of pending review or denied, additional information will be sent to support the outcome of this decision. Patient Instructions Neuralahart Activation Thank you for requesting access to Zhongjia MRO. Please follow the instructions below to securely access and download your online medical record. Zhongjia MRO allows you to send messages to your doctor, view your test results, renew your prescriptions, schedule appointments, and more. How Do I Sign Up? 1. In your internet browser, go to [email or web address] 
2. Click on the First Time User? Click Here link in the Sign In box. You will be redirect to the New Member Sign Up page. 3. Enter your Zhongjia MRO Access Code exactly as it appears below. You will not need to use this code after youve completed the sign-up process. If you do not sign up before the expiration date, you must request a new code. Zhongjia MRO Access Code: Activation code not generated Current Zhongjia MRO Status: Patient Declined (This is the date your Zhongjia MRO access code will ) 4. Enter the last four digits of your Social Security Number (xxxx) and Date of Birth (mm/dd/yyyy) as indicated and click Submit. You will be taken to the next sign-up page. 5. Create a Zhongjia MRO ID. This will be your Zhongjia MRO login ID and cannot be changed, so think of one that is secure and easy to remember. 6. Create a Propel password. You can change your password at any time. 7. Enter your Password Reset Question and Answer. This can be used at a later time if you forget your password. 8. Enter your e-mail address. You will receive e-mail notification when new information is available in [address] 9. Click Sign Up. You can now view and download portions of your medical record. 10. Click the Download Summary menu link to download a portable copy of your medical information. Additional Information If you have questions, please visit the Frequently Asked Questions section of the Propel website at [email or web address]. [email or web address] Remember, Propel is NOT to be used for urgent needs. For medical emergencies, dial 911. Please provide this summary of care documentation to your next provider. Your primary care clinician is listed as [facility]. If you have any questions after today's visit, please call [phone].

## 2017-03-23 NOTE — PATIENT INSTRUCTIONS
Richard Pauer - 3PharSocialOptimizr Activation    Thank you for requesting access to HourlyNerd. Please follow the instructions below to securely access and download your online medical record. HourlyNerd allows you to send messages to your doctor, view your test results, renew your prescriptions, schedule appointments, and more. How Do I Sign Up? 1. In your internet browser, go to [email or web address]  2. Click on the First Time User? Click Here link in the Sign In box. You will be redirect to the New Member Sign Up page. 3. Enter your HourlyNerd Access Code exactly as it appears below. You will not need to use this code after youve completed the sign-up process. If you do not sign up before the expiration date, you must request a new code. HourlyNerd Access Code: Activation code not generated  Current HourlyNerd Status: Patient Declined (This is the date your HourlyNerd access code will )    4. Enter the last four digits of your Social Security Number (xxxx) and Date of Birth (mm/dd/yyyy) as indicated and click Submit. You will be taken to the next sign-up page. 5. Create a HourlyNerd ID. This will be your HourlyNerd login ID and cannot be changed, so think of one that is secure and easy to remember. 6. Create a HourlyNerd password. You can change your password at any time. 7. Enter your Password Reset Question and Answer. This can be used at a later time if you forget your password. 8. Enter your e-mail address. You will receive e-mail notification when new information is available in [address] 9. Click Sign Up. You can now view and download portions of your medical record. 10. Click the Download Summary menu link to download a portable copy of your medical information. Additional Information    If you have questions, please visit the Frequently Asked Questions section of the HourlyNerd website at [email or web address] Remember, HourlyNerd is NOT to be used for urgent needs. For medical emergencies, dial 911.

## 2017-03-23 NOTE — PROGRESS NOTES
Luis Daniel Martinez is a 56 y.o. male and presents with Hospital Follow Up; Respiratory Distress; and Referral Request (Pulmonary)  . Subjective:  He was recently treated for acute respiratory failure  He comes in for a follow up evaluation today  COPD REVIEW:  The patient is being seen for follow up of COPD,the patient has been unstable,wheezing has been reported  Oxygen: He currently is on home oxygen therapy. Symptoms: chronic dyspnea is reported   Patient uses 2 pillows at night. Patient does continue to smoke cigarettes. Hypertension Review:  The patient has hypertension . Diet and Lifestyle: generally follows a low sodium diet, exercises sporadically  Home BP Monitoring: is not measured at home. Pertinent ROS: taking medications as instructed, no medication side effects noted, no TIA's, no chest pain on exertion, no dyspnea on exertion, no swelling of ankles. Diabetes Mellitus Review:  He has diabetes mellitus. Diabetic ROS - medication compliance: compliant all of the time, diabetic diet compliance: compliant all of the time, home glucose monitoring: is performed. Known diabetic complications: none  Cardiovascular risk factors: family history, dyslipidemia, diabetes mellitus, obesity, hypertension  Current diabetic medications include oral agents/insulin   Eye exam current (within one year): no  Weight trend: stable  Prior visit with dietician: no  Current diet: \"healthy\" diet  in general  Current exercise: walking  Current monitoring regimen: home blood tests - daily  Home blood sugar records: trend: stable  Any episodes of hypoglycemia? no  Is He on ACE inhibitor or angiotensin II receptor blocker? Yes       Back Pain Review:  Patient presents for evaluation of low back problems. Symptoms have been present for several months and include pain in lower back (dull, mild in character;7/10 in severity). Initial inciting event: none. Symptoms are worst: at times.  Alleviating factors identifiable by patient are lying flat, medication . Exacerbating factors identifiable by patient are bending forwards, bending backwards. Treatments so far initiated by patient: medication Previous lower back problems: reported. Previous workup: x-rays  He has pains radiating to the legs  He has had therapy with no relief. Luiza Montes His most recent MRI:IMPRESSION  Impression:  Suboptimal exam secondary to motion patient body habitus.   1. Bilateral spondylolyses at L5 without subluxation. 2. Multilevel degenerative disc disease and degenerative changes. Severe  bilateral facet arthropathy at L4-L5. Moderate sized left paracentral disc  protrusion effacing the left lateral recess. Multilevel spinal canal stenosis  ranging from mild to moderate/severe, worst at L3-L4. Multilevel neuroforaminal  narrowing ranging from mild to moderate. 3. Lower than expected signal in the bone marrow on the T1 sequence. This can be  related to benign entities, such as anemia. Clinical correlation is recommended. Review of Systems  Constitutional: negative for fevers, chills, anorexia and weight loss  Eyes:   negative for visual disturbance and irritation  ENT:   negative for tinnitus,sore throat,nasal congestion,ear pains. hoarseness  Respiratory:  negative for cough, hemoptysis, dyspnea,wheezing  CV:   negative for chest pain, palpitations, lower extremity edema  GI:   negative for nausea, vomiting, diarrhea, abdominal pain,melena  Endo:               negative for polyuria,polydipsia,polyphagia,heat intolerance  Genitourinary: negative for frequency, dysuria and hematuria  Integument:  negative for rash and pruritus  Hematologic:  negative for easy bruising and gum/nose bleeding  Musculoskel: myalgias, arthralgias, back pain, muscle weakness, joint pain  Neurological:  negative for headaches, dizziness, vertigo, memory problems and gait   Behavl/Psych: negative for feelings of anxiety, depression, mood changes    Past Medical History: Diagnosis Date    Diabetes ([facility]     type 2    Hypertension      History reviewed. No pertinent surgical history. Social History     Social History    Marital status: SINGLE     Spouse name: N/A    Number of children: N/A    Years of education: N/A     Social History Main Topics    Smoking status: Current Every Day Smoker     Packs/day: 1.00    Smokeless tobacco: Never Used    Alcohol use No      Comment: occ    Drug use: No    Sexual activity: Yes     Other Topics Concern    None     Social History Narrative     No family history on file. Current Outpatient Prescriptions   Medication Sig Dispense Refill    umeclidinium-vilanterol (ANORO ELLIPTA) 62.5-25 mcg/actuation inhaler Take 1 Puff by inhalation daily. 1 Inhaler 12    losartan (COZAAR) 100 mg tablet Take 100 mg by mouth daily.  OXYGEN-AIR DELIVERY SYSTEMS 3 L/min by Both Nostrils route continuous.  hydroCHLOROthiazide (HYDRODIURIL) 25 mg tablet Take 1 Tab by mouth daily for 30 days. 30 Tab 0    albuterol (PROVENTIL HFA, VENTOLIN HFA, PROAIR HFA) 90 mcg/actuation inhaler Take 1 Puff by inhalation every six (6) hours as needed for Wheezing. 1 Inhaler 0    nicotine (NICODERM CQ) 21 mg/24 hr 1 Patch by TransDERmal route daily for 30 days. 30 Patch 0    metFORMIN (GLUCOPHAGE) 1,000 mg tablet Take 1 Tab by mouth two (2) times daily (with meals). 60 Tab 12    naproxen sodium (ALEVE) 220 mg cap Take 1 Cap by mouth every twelve (12) hours as needed (Back Pain).       losartan (COZAAR) 100 mg tablet TAKE 1 TABLET BY MOUTH EVERY DAY(NON PAYMENT OF INS) (Patient not taking: No sig reported) 353 Tab 0     Allergies   Allergen Reactions    Iodine Hives       Objective:  Visit Vitals    /90    Pulse 88    Temp 97.8 °F (36.6 °C) (Oral)    Resp 16    Ht 6' (1.829 m)    Wt (!) 354 lb (160.6 kg)    SpO2 95%  Comment: 3 LPM O2    BMI 48.01 kg/m2     Physical Exam:   General appearance - alert, well appearing, and in no distress  Mental status - alert, oriented to person, place, and time  EYE-SILVIO, EOMI, corneas normal, no foreign bodies  ENT-ENT exam normal, no neck nodes or sinus tenderness  Nose - normal and patent, no erythema, discharge or polyps  Mouth - mucous membranes moist, pharynx normal without lesions  Neck - supple, no significant adenopathy   Chest - clear to auscultation, no wheezes, rales or rhonchi, symmetric air entry   Heart - normal rate, regular rhythm, normal S1, S2, no murmurs, rubs, clicks or gallops   Abdomen - soft, nontender, nondistended, no masses or organomegaly  Lymph- no adenopathy palpable  Ext-peripheral pulses normal, no pedal edema, no clubbing or cyanosis  Skin-Warm and dry. no hyperpigmentation, vitiligo, or suspicious lesions  Neuro -alert, oriented, normal speech, no focal findings or movement disorder noted  Neck-normal C-spine, no tenderness, full ROM without pain  Back-tenderness lower lumbar spine and sacral spine      Results for orders placed or performed in visit on 03/23/17   AMB POC GLUCOSE BLOOD, BY GLUCOSE MONITORING DEVICE   Result Value Ref Range    Glucose  mg/dL       Assessment/Plan:    ICD-10-CM ICD-9-CM    1. Controlled type 2 diabetes mellitus without complication, without long-term current use of insulin ([facility]) E11.9 250.00 AMB POC GLUCOSE BLOOD, BY GLUCOSE MONITORING DEVICE   2. Chronic obstructive pulmonary disease with acute exacerbation ([facility]) J44.1 491.21      Orders Placed This Encounter    AMB POC GLUCOSE BLOOD, BY GLUCOSE MONITORING DEVICE    umeclidinium-vilanterol (ANORO ELLIPTA) 62.5-25 mcg/actuation inhaler     Sig: Take 1 Puff by inhalation daily. Dispense:  1 Inhaler     Refill:  12     lose weight, increase physical activity, follow low fat diet, follow low salt diet, continue present plan,Take 81mg aspirin daily  Patient Instructions   "[employer] Activation    Thank you for requesting access to "[employer].  Please follow the instructions below to securely access and download your online medical record. HourlyNerd allows you to send messages to your doctor, view your test results, renew your prescriptions, schedule appointments, and more. How Do I Sign Up? 1. In your internet browser, go to [email or web address]  2. Click on the First Time User? Click Here link in the Sign In box. You will be redirect to the New Member Sign Up page. 3. Enter your HourlyNerd Access Code exactly as it appears below. You will not need to use this code after youve completed the sign-up process. If you do not sign up before the expiration date, you must request a new code. [employer]able [employer] Groupt Access Code: Activation code not generated  Current HourlyNerd Status: Patient Declined (This is the date your HourlyNerd access code will )    4. Enter the last four digits of your Social Security Number (xxxx) and Date of Birth (mm/dd/yyyy) as indicated and click Submit. You will be taken to the next sign-up page. 5. Create a HourlyNerd ID. This will be your HourlyNerd login ID and cannot be changed, so think of one that is secure and easy to remember. 6. Create a HourlyNerd password. You can change your password at any time. 7. Enter your Password Reset Question and Answer. This can be used at a later time if you forget your password. 8. Enter your e-mail address. You will receive e-mail notification when new information is available in [address] 9. Click Sign Up. You can now view and download portions of your medical record. 10. Click the Download Summary menu link to download a portable copy of your medical information. Additional Information    If you have questions, please visit the Frequently Asked Questions section of the HourlyNerd website at [email or web address] Remember, HourlyNerd is NOT to be used for urgent needs. For medical emergencies, dial 911.            Follow-up Disposition:  Return in about 4 weeks (around 2017), or if symptoms worsen or fail to improve. I have reviewed with the patient details of the assessment and plan and all questions were answered. Relevent patient education was performed    An After Visit Summary was printed and given to the patient.

## 2017-03-24 ENCOUNTER — HOME CARE VISIT (OUTPATIENT)
Dept: HOME HEALTH SERVICES | Facility: HOME HEALTH | Age: 47
End: 2017-03-24
Payer: COMMERCIAL

## 2017-03-24 ENCOUNTER — HOME CARE VISIT (OUTPATIENT)
Dept: SCHEDULING | Facility: HOME HEALTH | Age: 47
End: 2017-03-24
Payer: COMMERCIAL

## 2017-03-24 PROCEDURE — G0155 HHCP-SVS OF CSW,EA 15 MIN: HCPCS

## 2017-03-27 ENCOUNTER — HOME CARE VISIT (OUTPATIENT)
Dept: SCHEDULING | Facility: HOME HEALTH | Age: 47
End: 2017-03-27
Payer: COMMERCIAL

## 2017-03-27 PROCEDURE — G0299 HHS/HOSPICE OF RN EA 15 MIN: HCPCS

## 2017-03-28 ENCOUNTER — HOME CARE VISIT (OUTPATIENT)
Dept: SCHEDULING | Facility: HOME HEALTH | Age: 47
End: 2017-03-28
Payer: COMMERCIAL

## 2017-03-28 ENCOUNTER — HOME CARE VISIT (OUTPATIENT)
Dept: HOME HEALTH SERVICES | Facility: HOME HEALTH | Age: 47
End: 2017-03-28
Payer: COMMERCIAL

## 2017-03-28 VITALS
DIASTOLIC BLOOD PRESSURE: 86 MMHG | BODY MASS INDEX: 42.66 KG/M2 | HEIGHT: 72 IN | HEART RATE: 82 BPM | OXYGEN SATURATION: 94 % | TEMPERATURE: 97.4 F | RESPIRATION RATE: 18 BRPM | WEIGHT: 315 LBS | SYSTOLIC BLOOD PRESSURE: 146 MMHG

## 2017-03-28 VITALS
OXYGEN SATURATION: 94 % | DIASTOLIC BLOOD PRESSURE: 90 MMHG | TEMPERATURE: 98.6 F | HEART RATE: 78 BPM | SYSTOLIC BLOOD PRESSURE: 160 MMHG

## 2017-03-28 PROCEDURE — G0152 HHCP-SERV OF OT,EA 15 MIN: HCPCS

## 2017-03-29 ENCOUNTER — PATIENT OUTREACH (OUTPATIENT)
Dept: INTERNAL MEDICINE CLINIC | Age: 47
End: 2017-03-29

## 2017-03-29 ENCOUNTER — HOME CARE VISIT (OUTPATIENT)
Dept: SCHEDULING | Facility: HOME HEALTH | Age: 47
End: 2017-03-29
Payer: COMMERCIAL

## 2017-03-29 VITALS
SYSTOLIC BLOOD PRESSURE: 142 MMHG | HEART RATE: 80 BPM | DIASTOLIC BLOOD PRESSURE: 82 MMHG | TEMPERATURE: 98.5 F | RESPIRATION RATE: 18 BRPM | OXYGEN SATURATION: 95 %

## 2017-03-29 PROCEDURE — G0151 HHCP-SERV OF PT,EA 15 MIN: HCPCS

## 2017-03-29 NOTE — PROGRESS NOTES
Navigator reached the patient by telephone. HIPAA was verified by name and . Navigator notified the patient that the letter for his SNAP benefits was faxed to the fax number that he provided. The patient expressed thanks and ended the call.

## 2017-03-31 ENCOUNTER — HOME CARE VISIT (OUTPATIENT)
Dept: SCHEDULING | Facility: HOME HEALTH | Age: 47
End: 2017-03-31
Payer: COMMERCIAL

## 2017-03-31 VITALS
RESPIRATION RATE: 18 BRPM | DIASTOLIC BLOOD PRESSURE: 86 MMHG | HEART RATE: 84 BPM | OXYGEN SATURATION: 95 % | SYSTOLIC BLOOD PRESSURE: 144 MMHG | TEMPERATURE: 98.1 F

## 2017-03-31 PROCEDURE — G0151 HHCP-SERV OF PT,EA 15 MIN: HCPCS

## 2017-04-01 ENCOUNTER — HOME CARE VISIT (OUTPATIENT)
Dept: HOME HEALTH SERVICES | Facility: HOME HEALTH | Age: 47
End: 2017-04-01
Payer: COMMERCIAL

## 2017-04-03 RX ORDER — LOSARTAN POTASSIUM 100 MG/1
TABLET ORAL
Qty: 23 TAB | Refills: 0 | Status: SHIPPED | OUTPATIENT
Start: 2017-04-03 | End: 2017-04-22 | Stop reason: SDUPTHER

## 2017-04-04 ENCOUNTER — HOME CARE VISIT (OUTPATIENT)
Dept: SCHEDULING | Facility: HOME HEALTH | Age: 47
End: 2017-04-04
Payer: COMMERCIAL

## 2017-04-04 PROCEDURE — G0300 HHS/HOSPICE OF LPN EA 15 MIN: HCPCS

## 2017-04-07 ENCOUNTER — HOME CARE VISIT (OUTPATIENT)
Dept: SCHEDULING | Facility: HOME HEALTH | Age: 47
End: 2017-04-07
Payer: COMMERCIAL

## 2017-04-07 PROCEDURE — G0300 HHS/HOSPICE OF LPN EA 15 MIN: HCPCS

## 2017-04-10 ENCOUNTER — HOME CARE VISIT (OUTPATIENT)
Dept: SCHEDULING | Facility: HOME HEALTH | Age: 47
End: 2017-04-10
Payer: COMMERCIAL

## 2017-04-13 VITALS
SYSTOLIC BLOOD PRESSURE: 150 MMHG | HEART RATE: 98 BPM | TEMPERATURE: 98.2 F | OXYGEN SATURATION: 91 % | DIASTOLIC BLOOD PRESSURE: 88 MMHG | OXYGEN SATURATION: 92 % | HEART RATE: 80 BPM | RESPIRATION RATE: 18 BRPM | DIASTOLIC BLOOD PRESSURE: 100 MMHG | TEMPERATURE: 98 F | RESPIRATION RATE: 18 BRPM | SYSTOLIC BLOOD PRESSURE: 146 MMHG

## 2017-04-14 ENCOUNTER — HOME CARE VISIT (OUTPATIENT)
Dept: SCHEDULING | Facility: HOME HEALTH | Age: 47
End: 2017-04-14
Payer: COMMERCIAL

## 2017-04-14 PROCEDURE — G0299 HHS/HOSPICE OF RN EA 15 MIN: HCPCS

## 2017-04-17 VITALS
SYSTOLIC BLOOD PRESSURE: 142 MMHG | RESPIRATION RATE: 18 BRPM | HEART RATE: 78 BPM | TEMPERATURE: 97.4 F | DIASTOLIC BLOOD PRESSURE: 84 MMHG | OXYGEN SATURATION: 97 %

## 2017-04-22 RX ORDER — LOSARTAN POTASSIUM 100 MG/1
TABLET ORAL
Qty: 23 TAB | Refills: 0 | Status: SHIPPED | COMMUNITY
Start: 2017-04-22 | End: 2017-05-20 | Stop reason: SDUPTHER

## 2017-05-03 ENCOUNTER — OFFICE VISIT (OUTPATIENT)
Dept: INTERNAL MEDICINE CLINIC | Age: 47
End: 2017-05-03

## 2017-05-03 VITALS
RESPIRATION RATE: 20 BRPM | TEMPERATURE: 98.2 F | HEART RATE: 79 BPM | SYSTOLIC BLOOD PRESSURE: 140 MMHG | DIASTOLIC BLOOD PRESSURE: 90 MMHG | WEIGHT: 315 LBS | OXYGEN SATURATION: 94 % | BODY MASS INDEX: 42.66 KG/M2 | HEIGHT: 72 IN

## 2017-05-03 DIAGNOSIS — J44.1 CHRONIC OBSTRUCTIVE PULMONARY DISEASE WITH ACUTE EXACERBATION (HCC): ICD-10-CM

## 2017-05-03 DIAGNOSIS — I10 HTN (HYPERTENSION), MALIGNANT: ICD-10-CM

## 2017-05-03 DIAGNOSIS — G47.33 OBSTRUCTIVE SLEEP APNEA SYNDROME: ICD-10-CM

## 2017-05-03 DIAGNOSIS — E11.9 CONTROLLED TYPE 2 DIABETES MELLITUS WITHOUT COMPLICATION, WITHOUT LONG-TERM CURRENT USE OF INSULIN (HCC): Primary | ICD-10-CM

## 2017-05-03 DIAGNOSIS — E66.01 MORBID OBESITY, UNSPECIFIED OBESITY TYPE (HCC): ICD-10-CM

## 2017-05-03 LAB
GLUCOSE POC: 318 MG/DL
HBA1C MFR BLD HPLC: 11.2 %

## 2017-05-03 RX ORDER — PIOGLITAZONEHYDROCHLORIDE 45 MG/1
45 TABLET ORAL
Qty: 30 TAB | Refills: 12 | Status: SHIPPED | OUTPATIENT
Start: 2017-05-03

## 2017-05-03 RX ORDER — FLUTICASONE PROPIONATE AND SALMETEROL 250; 50 UG/1; UG/1
1 POWDER RESPIRATORY (INHALATION) 2 TIMES DAILY
Qty: 1 INHALER | Refills: 12 | Status: SHIPPED | OUTPATIENT
Start: 2017-05-03

## 2017-05-03 NOTE — PATIENT INSTRUCTIONS
[employer] Activation    Thank you for requesting access to InteraXon. Please follow the instructions below to securely access and download your online medical record. InteraXon allows you to send messages to your doctor, view your test results, renew your prescriptions, schedule appointments, and more. How Do I Sign Up? 1. In your internet browser, go to [email or web address]  2. Click on the First Time User? Click Here link in the Sign In box. You will be redirect to the New Member Sign Up page. 3. Enter your InteraXon Access Code exactly as it appears below. You will not need to use this code after youve completed the sign-up process. If you do not sign up before the expiration date, you must request a new code. InteraXon Access Code: Activation code not generated  Current InteraXon Status: Patient Declined (This is the date your InteraXon access code will )    4. Enter the last four digits of your Social Security Number (xxxx) and Date of Birth (mm/dd/yyyy) as indicated and click Submit. You will be taken to the next sign-up page. 5. Create a InteraXon ID. This will be your InteraXon login ID and cannot be changed, so think of one that is secure and easy to remember. 6. Create a InteraXon password. You can change your password at any time. 7. Enter your Password Reset Question and Answer. This can be used at a later time if you forget your password. 8. Enter your e-mail address. You will receive e-mail notification when new information is available in [address] 9. Click Sign Up. You can now view and download portions of your medical record. 10. Click the Download Summary menu link to download a portable copy of your medical information. Additional Information    If you have questions, please visit the Frequently Asked Questions section of the InteraXon website at [email or web address] Remember, InteraXon is NOT to be used for urgent needs. For medical emergencies, dial 911.

## 2017-05-03 NOTE — MR AVS SNAPSHOT
Visit Information Date & Time Provider Department Dept. Phone Encounter #  
 5/3/2017 11:45 AM Sanjeev Pires MD 140[address] [facility] [phone] [id number] Follow-up Instructions Return in about 4 weeks (around 5/31/2017), or if symptoms worsen or fail to improve. Upcoming Health Maintenance Date Due  
 EYE EXAM RETINAL OR DILATED Q1 2/20/1981 MICROALBUMIN Q1 5/31/2017 FOOT EXAM Q1 6/28/2017 INFLUENZA AGE 9 TO ADULT 8/1/2017 HEMOGLOBIN A1C Q6M 9/10/2017 LIPID PANEL Q1 1/5/2018 DTaP/Tdap/Td series (2 - Td) 12/15/2020 Allergies as of 5/3/2017  Review Complete On: 5/3/2017 By: Greg Joe LPN Severity Noted Reaction Type Reactions Iodine  10/21/2011    Hives Current Immunizations  Reviewed on 3/13/2017 Name Date DTaP 12/15/2010 Not reviewed this visit You Were Diagnosed With   
  
 Codes Comments Controlled type 2 diabetes mellitus without complication, without long-term current use of insulin ([facility]    -  Primary ICD-10-CM: E11.9 ICD-9-CM: 250.00 Chronic obstructive pulmonary disease with acute exacerbation (HCC)     ICD-10-CM: J44.1 ICD-9-CM: 491.21 Morbid obesity, unspecified obesity type     ICD-10-CM: E66.01 
ICD-9-CM: 278.01   
 HTN (hypertension), malignant     ICD-10-CM: I10 
ICD-9-CM: 401.0 Obstructive sleep apnea syndrome     ICD-10-CM: G47.33 
ICD-9-CM: 327.23 Vitals BP Pulse Temp Resp Height(growth percentile) Weight(growth percentile) 140/90 79 98.2 °F (36.8 °C) (Oral) 20 6' (1.829 m) (!) 352 lb (159.7 kg) SpO2 BMI Smoking Status 94% 47.74 kg/m2 Current Every Day Smoker Vitals History BMI and BSA Data Body Mass Index Body Surface Area 47.74 kg/m 2 2.85 m 2 Preferred Pharmacy Pharmacy Name Phone [id number] [id number] [facility] AT [address] [place] [address] [phone] Your Updated Medication List  
  
   
This list is accurate as of: 5/3/17 12:47 PM.  Always use your most recent med list.  
  
  
  
  
 albuterol 90 mcg/actuation inhaler Commonly known as:  PROVENTIL HFA, VENTOLIN HFA, PROAIR HFA Take 1 Puff by inhalation every six (6) hours as needed for Wheezing. ALEVE 220 mg Cap Generic drug:  naproxen sodium Take 1 Cap by mouth every twelve (12) hours as needed (Back Pain). ANORO ELLIPTA IN Take 1 Puff by inhalation daily. fluticasone-salmeterol 250-50 mcg/dose diskus inhaler Commonly known as:  ADVAIR Take 1 Puff by inhalation two (2) times a day. * losartan 100 mg tablet Commonly known as:  COZAAR  
TAKE 1 TABLET BY MOUTH EVERY DAY(NON PAYMENT OF INS) * losartan 100 mg tablet Commonly known as:  COZAAR  
TAKE 1 TABLET BY MOUTH EVERY DAY  
  
 metFORMIN 1,000 mg tablet Commonly known as:  GLUCOPHAGE Take 1 Tab by mouth two (2) times daily (with meals). OXYGEN-AIR DELIVERY SYSTEMS  
3 L/min by Both Nostrils route continuous. pioglitazone 45 mg tablet Commonly known as:  ACTOS Take 1 Tab by mouth nightly. * Notice: This list has 2 medication(s) that are the same as other medications prescribed for you. Read the directions carefully, and ask your doctor or other care provider to review them with you. Prescriptions Sent to Pharmacy Refills  
 fluticasone-salmeterol (ADVAIR) 250-50 mcg/dose diskus inhaler 12 Sig: Take 1 Puff by inhalation two (2) times a day. Class: Normal  
 Pharmacy: [facility]/pharmacy #[id number] Ph #: [phone] Route: Inhalation  
 pioglitazone (ACTOS) 45 mg tablet 12 Sig: Take 1 Tab by mouth nightly. Class: Normal  
 Pharmacy: [facility]/pharmacy #[id number] AT [address] [place] [address] Ph #: [phone] Route: Oral  
  
We Performed the Following AMB POC GLUCOSE BLOOD, BY GLUCOSE MONITORING DEVICE [99996 CPT(R)] AMB POC HEMOGLOBIN A1C [19277 CPT(R)] Follow-up Instructions Return in about 4 weeks (around 2017), or if symptoms worsen or fail to improve. Patient Instructions MyChart Activation Thank you for requesting access to [employer] [facility]. Please follow the instructions below to securely access and download your online medical record. [employer] [facility] allows you to send messages to your doctor, view your test results, renew your prescriptions, schedule appointments, and more. How Do I Sign Up? 1. In your internet browser, go to [email or web address] 
2. Click on the First Time User? Click Here link in the Sign In box. You will be redirect to the New Member Sign Up page. 3. Enter your [employer] Access Code exactly as it appears below. You will not need to use this code after youve completed the sign-up process. If you do not sign up before the expiration date, you must request a new code. [employer] Access Code: Activation code not generated Current [employer] [facility] Status: Patient Declined (This is the date your [employer] access code will ) 4. Enter the last four digits of your Social Security Number (xxxx) and Date of Birth (mm/dd/yyyy) as indicated and click Submit. You will be taken to the next sign-up page. 5. Create a [employer] ID. This will be your [employer] login ID and cannot be changed, so think of one that is secure and easy to remember. 6. Create a [employer] password. You can change your password at any time. 7. Enter your Password Reset Question and Answer. This can be used at a later time if you forget your password. 8. Enter your e-mail address. You will receive e-mail notification when new information is available in [address] 9. Click Sign Up. You can now view and download portions of your medical record. 10. Click the Download Summary menu link to download a portable copy of your medical information. Additional Information If you have questions, please visit the Frequently Asked Questions section of the freee website at [email or web address] Remember, [employer]e is NOT to be used for urgent needs. For medical emergencies, dial 911. Please provide this summary of care documentation to your next provider. Your primary care clinician is listed as Colonel Messenger. If you have any questions after today's visit, please call [phone].

## 2017-05-03 NOTE — PROGRESS NOTES
Kerwin Wisdom is a 56 y.o. male and presents with Diabetes and COPD  . Subjective:  He was recently treated for acute respiratory failure  He comes in for a follow up evaluation today      Sleep apnea Review: There is a history of excessive daytime fatigue with associated excessive snoring at night. There is also a history of periods of apnea at night as witnessed by a partner and family  The patient was recently tested and found to have sleep apnea. Willow Jenkins He is awaiting approval for cpap machine. COPD REVIEW:  The patient is being seen for follow up of COPD,the patient has been unstable,wheezing has been reported  Oxygen: He currently is on home oxygen therapy. Symptoms: chronic dyspnea is reported   Patient uses 2 pillows at night. Patient does continue to smoke cigarettes. Hypertension Review:  The patient has hypertension . Diet and Lifestyle: generally follows a low sodium diet, exercises sporadically  Home BP Monitoring: is not measured at home. Pertinent ROS: taking medications as instructed, no medication side effects noted, no TIA's, no chest pain on exertion, no dyspnea on exertion, no swelling of ankles. Diabetes Mellitus Review:  He has diabetes mellitus. Diabetic ROS - medication compliance: compliant all of the time, diabetic diet compliance: compliant all of the time, home glucose monitoring: is performed. Known diabetic complications: none  Cardiovascular risk factors: family history, dyslipidemia, diabetes mellitus, obesity, hypertension  Current diabetic medications include oral agents/insulin   Eye exam current (within one year): no  Weight trend: stable  Prior visit with dietician: no  Current diet: \"healthy\" diet  in general  Current exercise: walking  Current monitoring regimen: home blood tests - daily  Home blood sugar records: trend: stable  Any episodes of hypoglycemia? no  Is He on ACE inhibitor or angiotensin II receptor blocker?    Yes       Back Pain Review:  Patient presents for evaluation of low back problems. Symptoms have been present for several months and include pain in lower back (dull, mild in character;7/10 in severity). Initial inciting event: none. Symptoms are worst: at times. Alleviating factors identifiable by patient are lying flat, medication . Exacerbating factors identifiable by patient are bending forwards, bending backwards. Treatments so far initiated by patient: medication Previous lower back problems: reported. Previous workup: x-rays  He has pains radiating to the legs  He has had therapy with no relief. Luis E Blanchard His most recent MRI:IMPRESSION  Impression:  Suboptimal exam secondary to motion patient body habitus.   1. Bilateral spondylolyses at L5 without subluxation. 2. Multilevel degenerative disc disease and degenerative changes. Severe  bilateral facet arthropathy at L4-L5. Moderate sized left paracentral disc  protrusion effacing the left lateral recess. Multilevel spinal canal stenosis  ranging from mild to moderate/severe, worst at L3-L4. Multilevel neuroforaminal  narrowing ranging from mild to moderate. 3. Lower than expected signal in the bone marrow on the T1 sequence. This can be  related to benign entities, such as anemia. Clinical correlation is recommended. Review of Systems  Constitutional: negative for fevers, chills, anorexia and weight loss  Eyes:   negative for visual disturbance and irritation  ENT:   negative for tinnitus,sore throat,nasal congestion,ear pains. hoarseness  Respiratory:  negative for cough, hemoptysis, dyspnea,wheezing  CV:   negative for chest pain, palpitations, lower extremity edema  GI:   negative for nausea, vomiting, diarrhea, abdominal pain,melena  Endo:               negative for polyuria,polydipsia,polyphagia,heat intolerance  Genitourinary: negative for frequency, dysuria and hematuria  Integument:  negative for rash and pruritus  Hematologic:  negative for easy bruising and gum/nose bleeding  Musculoskel: myalgias, arthralgias, back pain, muscle weakness, joint pain  Neurological:  negative for headaches, dizziness, vertigo, memory problems and gait   Behavl/Psych: negative for feelings of anxiety, depression, mood changes    Past Medical History:   Diagnosis Date    Diabetes ([address]     type 2    Hypertension      History reviewed. No pertinent surgical history. Social History     Social History    Marital status: SINGLE     Spouse name: N/A    Number of children: N/A    Years of education: N/A     Social History Main Topics    Smoking status: Current Every Day Smoker     Packs/day: 1.00    Smokeless tobacco: Never Used    Alcohol use No      Comment: occ    Drug use: No    Sexual activity: Yes     Other Topics Concern    None     Social History Narrative     History reviewed. No pertinent family history. Current Outpatient Prescriptions   Medication Sig Dispense Refill    fluticasone-salmeterol (ADVAIR) 250-50 mcg/dose diskus inhaler Take 1 Puff by inhalation two (2) times a day. 1 Inhaler 12    pioglitazone (ACTOS) 45 mg tablet Take 1 Tab by mouth nightly. 30 Tab 12    losartan (COZAAR) 100 mg tablet TAKE 1 TABLET BY MOUTH EVERY DAY 23 Tab 0    UMECLIDINIUM BRM/VILANTEROL TR (ANORO ELLIPTA IN) Take 1 Puff by inhalation daily.  naproxen sodium (ALEVE) 220 mg cap Take 1 Cap by mouth every twelve (12) hours as needed (Back Pain).  OXYGEN-AIR DELIVERY SYSTEMS 3 L/min by Both Nostrils route continuous.  albuterol (PROVENTIL HFA, VENTOLIN HFA, PROAIR HFA) 90 mcg/actuation inhaler Take 1 Puff by inhalation every six (6) hours as needed for Wheezing. 1 Inhaler 0    metFORMIN (GLUCOPHAGE) 1,000 mg tablet Take 1 Tab by mouth two (2) times daily (with meals).  60 Tab 12    losartan (COZAAR) 100 mg tablet TAKE 1 TABLET BY MOUTH EVERY DAY(NON PAYMENT OF INS) 353 Tab 0     Allergies   Allergen Reactions    Iodine Hives       Objective:  Visit Vitals    /90    Pulse 79    Temp 98.2 °F (36.8 °C) (Oral)    Resp 20    Ht 6' (1.829 m)    Wt (!) 352 lb (159.7 kg)    SpO2 94%    BMI 47.74 kg/m2     Physical Exam:   General appearance - alert, well appearing, and in no distress  Mental status - alert, oriented to person, place, and time  EYE-SILVIO, EOMI, corneas normal, no foreign bodies  ENT-ENT exam normal, no neck nodes or sinus tenderness  Nose - normal and patent, no erythema, discharge or polyps  Mouth - mucous membranes moist, pharynx normal without lesions  Neck - supple, no significant adenopathy   Chest - clear to auscultation, no wheezes, rales or rhonchi, symmetric air entry   Heart - normal rate, regular rhythm, normal S1, S2, no murmurs, rubs, clicks or gallops   Abdomen - soft, nontender, nondistended, no masses or organomegaly  Lymph- no adenopathy palpable  Ext-peripheral pulses normal, no pedal edema, no clubbing or cyanosis  Skin-Warm and dry. no hyperpigmentation, vitiligo, or suspicious lesions  Neuro -alert, oriented, normal speech, no focal findings or movement disorder noted  Neck-normal C-spine, no tenderness, full ROM without pain  Back-tenderness lower lumbar spine and sacral spine      Results for orders placed or performed in visit on 03/23/17   AMB POC GLUCOSE BLOOD, BY GLUCOSE MONITORING DEVICE   Result Value Ref Range    Glucose  mg/dL       Assessment/Plan:    ICD-10-CM ICD-9-CM    1. Controlled type 2 diabetes mellitus without complication, without long-term current use of insulin ([facility]) E11.9 250.00 AMB POC GLUCOSE BLOOD, BY GLUCOSE MONITORING DEVICE      AMB POC HEMOGLOBIN A1C   2. Chronic obstructive pulmonary disease with acute exacerbation ([facility]) J44.1 491.21    3. Morbid obesity, unspecified obesity type E66.01 278.01    4. HTN (hypertension), malignant I10 401.0    5.  Obstructive sleep apnea syndrome G47.33 327.23      Orders Placed This Encounter    AMB POC GLUCOSE BLOOD, BY GLUCOSE MONITORING DEVICE    AMB POC HEMOGLOBIN A1C    fluticasone-salmeterol (ADVAIR) 250-50 mcg/dose diskus inhaler     Sig: Take 1 Puff by inhalation two (2) times a day. Dispense:  1 Inhaler     Refill:  12    pioglitazone (ACTOS) 45 mg tablet     Sig: Take 1 Tab by mouth nightly. Dispense:  30 Tab     Refill:  12     lose weight, increase physical activity, follow low fat diet, follow low salt diet, continue present plan,Take 81mg aspirin daily  Patient Instructions   [employer] Activation    Thank you for requesting access to [employer]. Please follow the instructions below to securely access and download your online medical record. [employer] allows you to send messages to your doctor, view your test results, renew your prescriptions, schedule appointments, and more. How Do I Sign Up? 1. In your internet browser, go to [email or web address]  2. Click on the First Time User? Click Here link in the Sign In box. You will be redirect to the New Member Sign Up page. 3. Enter your [employer] Access Code exactly as it appears below. You will not need to use this code after youve completed the sign-up process. If you do not sign up before the expiration date, you must request a new code. [employer] Access Code: Activation code not generated  Current [employer] Status: Patient Declined (This is the date your [employer] access code will )    4. Enter the last four digits of your Social Security Number (xxxx) and Date of Birth (mm/dd/yyyy) as indicated and click Submit. You will be taken to the next sign-up page. 5. Create a [employer] ID. This will be your [employer] login ID and cannot be changed, so think of one that is secure and easy to remember. 6. Create a [employer] password. You can change your password at any time. 7. Enter your Password Reset Question and Answer. This can be used at a later time if you forget your password. 8. Enter your e-mail address. You will receive e-mail notification when new information is available in [address] 9. Click Sign Up.  You can now view and download portions of your medical record. 10. Click the Download Summary menu link to download a portable copy of your medical information. Additional Information    If you have questions, please visit the Frequently Asked Questions section of the [employer] website at [email or web address] Remember, [employer] is NOT to be used for urgent needs. For medical emergencies, dial 911. Follow-up Disposition:  Return in about 4 weeks (around 5/31/2017), or if symptoms worsen or fail to improve. I have reviewed with the patient details of the assessment and plan and all questions were answered. Relevent patient education was performed    An After Visit Summary was printed and given to the patient.

## 2017-05-16 ENCOUNTER — PATIENT OUTREACH (OUTPATIENT)
Dept: INTERNAL MEDICINE CLINIC | Age: 47
End: 2017-05-16

## 2017-05-16 DIAGNOSIS — E11.00 TYPE 2 DIABETES MELLITUS WITH HYPEROSMOLARITY WITHOUT COMA, WITHOUT LONG-TERM CURRENT USE OF INSULIN (HCC): Primary | ICD-10-CM

## 2017-05-16 RX ORDER — INSULIN PUMP SYRINGE, 3 ML
EACH MISCELLANEOUS
Qty: 1 KIT | Refills: 0 | Status: CANCELLED | OUTPATIENT
Start: 2017-05-16 | End: 2018-05-16

## 2017-05-16 RX ORDER — LANCETS
EACH MISCELLANEOUS
Qty: 1 EACH | Refills: 11 | Status: CANCELLED | OUTPATIENT
Start: 2017-05-16 | End: 2018-05-16

## 2017-05-16 NOTE — TELEPHONE ENCOUNTER
The patient is requesting a new order for diabetic testing supplies d/t a change in his health insurance.

## 2017-05-16 NOTE — PROGRESS NOTES
Inbound call to the Navigator from the patient. HIPAA was verified by name and . Due to a change in his health insurance, the patient requests a new prescription for diabetic testing supplies. An order shall be entered into [employer]. Diabetes  He is aware that his A1c is 11%. He understands that a goal A1c of <6% is achievable but it shall take time. COPD  He is continuing to use home O2. Still waiting for CPAP. Reports sleeping a little bit better. He understands that he must follow up with his pulmonologist to finalize the settings on his CPAP. Social influences to healthcare  He states that he was denied disability and [place] Medicaid but is planning to reapply. He has not completed a self referral to [address] [place] [address] of aging and rehabilitative services[facility] The patient states that wants to return to work. 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 Plan: The patient has not had any inpatient or ED encounters within 30 days of the last inpatient discharge. The transition of care episode shall be closed. Will open an episode for complex care management b/c the patient has multiple co-morbid conditions that may increase in severity without case management intervention. Assess the patient's interest in long term goal development for A1c reduction during the next contact.

## 2017-05-18 RX ORDER — LANCETS
EACH MISCELLANEOUS
Qty: 1 EACH | Refills: 11 | Status: SHIPPED | OUTPATIENT
Start: 2017-05-18

## 2017-05-18 RX ORDER — INSULIN PUMP SYRINGE, 3 ML
EACH MISCELLANEOUS
Qty: 1 KIT | Refills: 0 | Status: SHIPPED | OUTPATIENT
Start: 2017-05-18 | End: 2018-05-16

## 2017-05-22 RX ORDER — LOSARTAN POTASSIUM 100 MG/1
TABLET ORAL
Qty: 23 TAB | Refills: 0 | Status: SHIPPED | OUTPATIENT
Start: 2017-05-22 | End: 2017-05-25 | Stop reason: SDUPTHER

## 2017-05-25 ENCOUNTER — PATIENT OUTREACH (OUTPATIENT)
Dept: INTERNAL MEDICINE CLINIC | Age: 47
End: 2017-05-25

## 2017-05-25 NOTE — PROGRESS NOTES
Complex case management  Inbound call to the Navigator from the patient. HIPPA was verified by name and . Medication  The patient reports a problem obtaining his Advair medication. The patient states that his pharmacy informed him that he needed a prior authorization for each fill of Advair. We developed goals as outlined below. Plan: will contact the patient to assess goal progress in about 3 days.

## 2017-05-26 RX ORDER — LOSARTAN POTASSIUM 100 MG/1
TABLET ORAL
Qty: 23 TAB | Refills: 0 | Status: SHIPPED | OUTPATIENT
Start: 2017-05-26

## 2021-01-02 NOTE — PROGRESS NOTES
Inbound call to the Navigator form the patient. HIPAA was verified by name and . The patient states that he is feeling somewhat better today than on hospital discharge day. He is participating with home health services. He noticed that his O% dropped slightly and his b/p was higher yesterday. The patient is requesting a letter form Ildefonso Caldwell MD to assist him with obtaining SNAP benefits. He also needs assistance with ensuring that he has the correct location for his next appointment at [address] [place] [address]. He is using the [facility] transit and wants to make sure that he exits the bus in the best location. We discussed planning for a different type of employment if he cannot return to his present job. The patient is open to receive information about department of aging and rehabilitation and the local workforce resource center. The directions for the locations listed above have been researched, printed and mailed to the patient.
Home

## 2021-02-23 ENCOUNTER — HOSPITAL ENCOUNTER (EMERGENCY)
Age: 51
Discharge: HOME | End: 2021-02-23
Attending: EMERGENCY MEDICINE | Admitting: EMERGENCY MEDICINE
Payer: MEDICARE

## 2021-02-23 VITALS
SYSTOLIC BLOOD PRESSURE: 140 MMHG | HEART RATE: 78 BPM | OXYGEN SATURATION: 92 % | DIASTOLIC BLOOD PRESSURE: 87 MMHG | RESPIRATION RATE: 18 BRPM

## 2021-02-23 DIAGNOSIS — L02.11 CUTANEOUS ABSCESS OF NECK: Primary | ICD-10-CM

## 2021-02-23 PROCEDURE — 74011250637 HC RX REV CODE- 250/637: Performed by: EMERGENCY MEDICINE

## 2021-02-23 PROCEDURE — 99283 EMERGENCY DEPT VISIT LOW MDM: CPT

## 2021-02-23 PROCEDURE — 75810000289 HC I&D ABSCESS SIMP/COMP/MULT

## 2021-02-23 RX ORDER — CEPHALEXIN 500 MG/1
500 CAPSULE ORAL 4 TIMES DAILY
Qty: 28 CAP | Refills: 0 | Status: SHIPPED | OUTPATIENT
Start: 2021-02-23 | End: 2021-03-02

## 2021-02-23 RX ORDER — CEPHALEXIN 500 MG/1
1000 CAPSULE ORAL
Status: COMPLETED | OUTPATIENT
Start: 2021-02-23 | End: 2021-02-23

## 2021-02-23 RX ORDER — MUPIROCIN 20 MG/G
OINTMENT TOPICAL
Status: COMPLETED | OUTPATIENT
Start: 2021-02-23 | End: 2021-02-23

## 2021-02-23 RX ADMIN — CEPHALEXIN 1000 MG: 500 CAPSULE ORAL at 15:37

## 2021-02-23 RX ADMIN — MUPIROCIN: 20 OINTMENT TOPICAL at 15:37

## 2021-02-23 NOTE — ED PROVIDER NOTES
HPI patient is a 60-year-old obese male with type 2 diabetes on insulin, congestive heart failure, hypertension and sleep apnea who presents to the ED with a tender swollen area on the left lower aspect of his neck that he states has been there for several days. He states that this morning he pushed on it and it started leaking. He denies fever, pain, extending erythema, or bleeding. He states he has had ingrown hairs and small skin abscesses previously that feels similar. He has not had any pain medications today prior to arrival.    Past Medical History:   Diagnosis Date    Diabetes ([facility] [address]     type 2    Heart failure ([facility] [address]     Hypertension     Sleep apnea        No past surgical history on file. History reviewed. No pertinent family history.     Social History     Socioeconomic History    Marital status: SINGLE     Spouse name: Not on file    Number of children: Not on file    Years of education: Not on file    Highest education level: Not on file   Occupational History    Not on file   Social Needs    Financial resource strain: Not on file    Food insecurity     Worry: Not on file     Inability: Not on file    Transportation needs     Medical: Not on file     Non-medical: Not on file   Tobacco Use    Smoking status: Current Every Day Smoker     Packs/day: 1.00    Smokeless tobacco: Never Used   Substance and Sexual Activity    Alcohol use: No     Comment: occ    Drug use: No    Sexual activity: Yes   Lifestyle    Physical activity     Days per week: Not on file     Minutes per session: Not on file    Stress: Not on file   Relationships    Social connections     Talks on phone: Not on file     Gets together: Not on file     Attends [employer] service: Not on file     Active member of club or organization: Not on file     Attends meetings of clubs or organizations: Not on file     Relationship status: Not on file    Intimate partner violence     Fear of current or ex partner: Not on file Emotionally abused: Not on file     Physically abused: Not on file     Forced sexual activity: Not on file   Other Topics Concern    Not on file   Social History Narrative    Not on file         ALLERGIES: Iodine and Lisinopril    Review of Systems   Constitutional: Negative for activity change, appetite change, fever and unexpected weight change. HENT: Negative for congestion, ear discharge, rhinorrhea and trouble swallowing. Eyes: Negative for visual disturbance. Respiratory: Negative for cough and shortness of breath. Cardiovascular: Negative for chest pain, palpitations and leg swelling. Gastrointestinal: Negative for abdominal pain, constipation, diarrhea, nausea and vomiting. Genitourinary: Negative for flank pain. Musculoskeletal: Negative for arthralgias and myalgias. Skin: Negative for rash. Firm, Tender, swollen nodule lower left side of his neck in the hairline; without extending erythema, drainage or bleeding. Neurological: Negative for dizziness, light-headedness and headaches. All other systems reviewed and are negative. There were no vitals filed for this visit. Physical Exam  Vitals signs and nursing note reviewed. Constitutional:       General: He is not in acute distress. Appearance: Normal appearance. He is obese. He is not ill-appearing, toxic-appearing or diaphoretic. Comments: Male; non smoker; disabled   Neck:      Musculoskeletal: Normal range of motion and neck supple. Comments: 2.5 cm tender firm nodule on the base of the left side of the neck; small amount of fluctuance; no active drainage, bleeding or extending erythema  Cardiovascular:      Rate and Rhythm: Normal rate and regular rhythm. Pulmonary:      Effort: Pulmonary effort is normal.      Breath sounds: Normal breath sounds. Musculoskeletal: Normal range of motion. Skin:     General: Skin is warm. Neurological:      General: No focal deficit present. Mental Status: He is alert and oriented to person, place, and time. MDM       I&D Abcess Complex    Date/Time: 2/23/2021 4:09 PM  Performed by: Chastity Holley NP  Authorized by: Chastity Holley NP     Consent:     Consent obtained:  Verbal    Consent given by:  Patient    Risks discussed:  Bleeding and incomplete drainage    Alternatives discussed:  Referral and delayed treatment  Location:     Type:  Abscess    Size:  2.5cm     Location:  Neck    Neck location:  L anterior  Pre-procedure details:     Skin preparation:  Hibiclens  Procedure type:     Complexity:  Complex  Procedure details:     Needle aspiration: yes      Needle size:  22 G    Incision types:  Single straight    Incision depth:  Subcutaneous    Drainage:  Purulent    Drainage amount:  Scant    Wound treatment:  Wound left open  Post-procedure details:     Patient tolerance of procedure: Tolerated well, no immediate complications  Comments:      Wound care instructions were reviewed with the patient; good neurovascular sensation before and after the wound care procedure. Pam Munoz NP          Plan to treat with topical Bactroban and oral Keflex. Reviewed skin recommendations with  Chen Severino. Follow up with the dermatologist and/ro PCP  if no improvement for further evaluation. Use only unscented soaps and lotions. 4:11 PM  Patient's results and plan of care have been reviewed with him. Patient has verbally conveyed his understanding and agreement of his signs, symptoms, diagnosis, treatment and prognosis and additionally agrees to follow up as recommended or return to the Emergency Room should his condition change prior to follow-up. Discharge instructions have also been provided to the patient with some educational information regarding his diagnosis as well a list of reasons why he would want to return to the ER prior to his follow-up appointment should his condition change. Pam Munoz NP

## 2021-02-23 NOTE — DISCHARGE INSTRUCTIONS
APPLY WARM COMPRESSES. FOLLOW UP IF ANY INCREASE IN REDNESS OR STREAKING OF REDNESS AS DISCUSSED. FINISH ANTIBIOTICS AS PRESCRIBED.

## 2021-02-23 NOTE — ED TRIAGE NOTES
Pt arrives c/o lump to left side of neck and reports some drainage. \"its been there a couple months but has gotten bigger over the last few days\" denies redness.

## 2021-02-23 NOTE — ED NOTES
Discharge, medications, and follow up discussed by the provider, patient verbalized understanding. Patient ambulatory to the exit with steady gait. VSS and GCS 15 at time of discharge.

## 2021-07-28 ENCOUNTER — APPOINTMENT (OUTPATIENT)
Dept: GENERAL RADIOLOGY | Age: 51
End: 2021-07-28
Attending: EMERGENCY MEDICINE
Payer: MEDICARE

## 2021-07-28 ENCOUNTER — HOSPITAL ENCOUNTER (EMERGENCY)
Age: 51
Discharge: HOME | End: 2021-07-28
Attending: EMERGENCY MEDICINE
Payer: MEDICARE

## 2021-07-28 VITALS
RESPIRATION RATE: 14 BRPM | HEIGHT: 72 IN | BODY MASS INDEX: 42.66 KG/M2 | OXYGEN SATURATION: 94 % | SYSTOLIC BLOOD PRESSURE: 134 MMHG | HEART RATE: 55 BPM | TEMPERATURE: 98.6 F | WEIGHT: 315 LBS | DIASTOLIC BLOOD PRESSURE: 79 MMHG

## 2021-07-28 DIAGNOSIS — K21.9 GASTROESOPHAGEAL REFLUX DISEASE, UNSPECIFIED WHETHER ESOPHAGITIS PRESENT: Primary | ICD-10-CM

## 2021-07-28 DIAGNOSIS — R07.89 CHEST DISCOMFORT: ICD-10-CM

## 2021-07-28 DIAGNOSIS — R42 DIZZINESS: ICD-10-CM

## 2021-07-28 LAB
ALBUMIN SERPL-MCNC: 3.7 G/DL (ref 3.5–5)
ALBUMIN/GLOB SERPL: 1.1 {RATIO} (ref 1.1–2.2)
ALP SERPL-CCNC: 57 U/L (ref 45–117)
ALT SERPL-CCNC: 54 U/L (ref 12–78)
ANION GAP SERPL CALC-SCNC: 6 MMOL/L (ref 5–15)
APPEARANCE UR: CLEAR
AST SERPL-CCNC: 30 U/L (ref 15–37)
ATRIAL RATE: 75 BPM
BACTERIA URNS QL MICRO: NEGATIVE /HPF
BASOPHILS # BLD: 0.1 K/UL (ref 0–0.1)
BASOPHILS NFR BLD: 1 % (ref 0–1)
BILIRUB SERPL-MCNC: 0.6 MG/DL (ref 0.2–1)
BILIRUB UR QL: NEGATIVE
BUN SERPL-MCNC: 15 MG/DL (ref 6–20)
BUN/CREAT SERPL: 17 (ref 12–20)
CALCIUM SERPL-MCNC: 8.8 MG/DL (ref 8.5–10.1)
CALCULATED P AXIS, ECG09: 28 DEGREES
CALCULATED R AXIS, ECG10: -8 DEGREES
CALCULATED T AXIS, ECG11: 14 DEGREES
CHLORIDE SERPL-SCNC: 104 MMOL/L (ref 97–108)
CK MB CFR SERPL CALC: 2.3 % (ref 0–2.5)
CK MB SERPL-MCNC: 4.5 NG/ML (ref 5–25)
CK SERPL-CCNC: 197 U/L (ref 39–308)
CO2 SERPL-SCNC: 30 MMOL/L (ref 21–32)
COLOR UR: ABNORMAL
COMMENT, HOLDF: NORMAL
CREAT SERPL-MCNC: 0.86 MG/DL (ref 0.7–1.3)
D DIMER PPP FEU-MCNC: 0.37 MG/L FEU (ref 0–0.65)
DIAGNOSIS, 93000: NORMAL
DIFFERENTIAL METHOD BLD: NORMAL
EOSINOPHIL # BLD: 0.1 K/UL (ref 0–0.4)
EOSINOPHIL NFR BLD: 2 % (ref 0–7)
EPITH CASTS URNS QL MICRO: ABNORMAL /LPF
ERYTHROCYTE [DISTWIDTH] IN BLOOD BY AUTOMATED COUNT: 11.9 % (ref 11.5–14.5)
GLOBULIN SER CALC-MCNC: 3.5 G/DL (ref 2–4)
GLUCOSE SERPL-MCNC: 133 MG/DL (ref 65–100)
GLUCOSE UR STRIP.AUTO-MCNC: NEGATIVE MG/DL
HCT VFR BLD AUTO: 46.5 % (ref 36.6–50.3)
HGB BLD-MCNC: 15 G/DL (ref 12.1–17)
HGB UR QL STRIP: NEGATIVE
IMM GRANULOCYTES # BLD AUTO: 0 K/UL (ref 0–0.04)
IMM GRANULOCYTES NFR BLD AUTO: 0 % (ref 0–0.5)
KETONES UR QL STRIP.AUTO: ABNORMAL MG/DL
LEUKOCYTE ESTERASE UR QL STRIP.AUTO: NEGATIVE
LIPASE SERPL-CCNC: 174 U/L (ref 73–393)
LYMPHOCYTES # BLD: 1.8 K/UL (ref 0.8–3.5)
LYMPHOCYTES NFR BLD: 24 % (ref 12–49)
MAGNESIUM SERPL-MCNC: 2 MG/DL (ref 1.6–2.4)
MCH RBC QN AUTO: 29.9 PG (ref 26–34)
MCHC RBC AUTO-ENTMCNC: 32.3 G/DL (ref 30–36.5)
MCV RBC AUTO: 92.8 FL (ref 80–99)
MONOCYTES # BLD: 0.7 K/UL (ref 0–1)
MONOCYTES NFR BLD: 9 % (ref 5–13)
NEUTS SEG # BLD: 4.8 K/UL (ref 1.8–8)
NEUTS SEG NFR BLD: 64 % (ref 32–75)
NITRITE UR QL STRIP.AUTO: NEGATIVE
NRBC # BLD: 0 K/UL (ref 0–0.01)
NRBC BLD-RTO: 0 PER 100 WBC
P-R INTERVAL, ECG05: 126 MS
PH UR STRIP: 5.5 [PH] (ref 5–8)
PLATELET # BLD AUTO: 225 K/UL (ref 150–400)
PMV BLD AUTO: 10.3 FL (ref 8.9–12.9)
POTASSIUM SERPL-SCNC: 3.6 MMOL/L (ref 3.5–5.1)
PROT SERPL-MCNC: 7.2 G/DL (ref 6.4–8.2)
PROT UR STRIP-MCNC: NEGATIVE MG/DL
Q-T INTERVAL, ECG07: 390 MS
QRS DURATION, ECG06: 96 MS
QTC CALCULATION (BEZET), ECG08: 435 MS
RBC # BLD AUTO: 5.01 M/UL (ref 4.1–5.7)
RBC #/AREA URNS HPF: ABNORMAL /HPF (ref 0–5)
SAMPLES BEING HELD,HOLD: NORMAL
SODIUM SERPL-SCNC: 140 MMOL/L (ref 136–145)
SP GR UR REFRACTOMETRY: 1.03 (ref 1–1.03)
TROPONIN I BLD-MCNC: <0.04 NG/ML (ref 0–0.08)
TROPONIN I BLD-MCNC: <0.04 NG/ML (ref 0–0.08)
TROPONIN I SERPL-MCNC: <0.05 NG/ML
TSH SERPL DL<=0.05 MIU/L-ACNC: 4.72 UIU/ML (ref 0.36–3.74)
UA: UC IF INDICATED,UAUC: ABNORMAL
UROBILINOGEN UR QL STRIP.AUTO: 0.2 EU/DL (ref 0.2–1)
VENTRICULAR RATE, ECG03: 75 BPM
WBC # BLD AUTO: 7.4 K/UL (ref 4.1–11.1)
WBC URNS QL MICRO: ABNORMAL /HPF (ref 0–4)

## 2021-07-28 PROCEDURE — 85025 COMPLETE CBC W/AUTO DIFF WBC: CPT

## 2021-07-28 PROCEDURE — 93005 ELECTROCARDIOGRAM TRACING: CPT

## 2021-07-28 PROCEDURE — 80053 COMPREHEN METABOLIC PANEL: CPT

## 2021-07-28 PROCEDURE — 83735 ASSAY OF MAGNESIUM: CPT

## 2021-07-28 PROCEDURE — 96374 THER/PROPH/DIAG INJ IV PUSH: CPT

## 2021-07-28 PROCEDURE — 84443 ASSAY THYROID STIM HORMONE: CPT

## 2021-07-28 PROCEDURE — 74011250636 HC RX REV CODE- 250/636: Performed by: EMERGENCY MEDICINE

## 2021-07-28 PROCEDURE — 82550 ASSAY OF CK (CPK): CPT

## 2021-07-28 PROCEDURE — 36415 COLL VENOUS BLD VENIPUNCTURE: CPT

## 2021-07-28 PROCEDURE — 81001 URINALYSIS AUTO W/SCOPE: CPT

## 2021-07-28 PROCEDURE — 99285 EMERGENCY DEPT VISIT HI MDM: CPT

## 2021-07-28 PROCEDURE — 74011250637 HC RX REV CODE- 250/637: Performed by: EMERGENCY MEDICINE

## 2021-07-28 PROCEDURE — 71045 X-RAY EXAM CHEST 1 VIEW: CPT

## 2021-07-28 PROCEDURE — 84484 ASSAY OF TROPONIN QUANT: CPT

## 2021-07-28 PROCEDURE — 83690 ASSAY OF LIPASE: CPT

## 2021-07-28 PROCEDURE — 2709999900 HC NON-CHARGEABLE SUPPLY

## 2021-07-28 PROCEDURE — 74011000250 HC RX REV CODE- 250: Performed by: EMERGENCY MEDICINE

## 2021-07-28 PROCEDURE — 85379 FIBRIN DEGRADATION QUANT: CPT

## 2021-07-28 RX ORDER — SODIUM CHLORIDE 0.9 % (FLUSH) 0.9 %
5-40 SYRINGE (ML) INJECTION EVERY 8 HOURS
Status: DISCONTINUED | OUTPATIENT
Start: 2021-07-28 | End: 2021-07-28 | Stop reason: HOSPADM

## 2021-07-28 RX ORDER — SODIUM CHLORIDE 0.9 % (FLUSH) 0.9 %
5-40 SYRINGE (ML) INJECTION AS NEEDED
Status: DISCONTINUED | OUTPATIENT
Start: 2021-07-28 | End: 2021-07-28 | Stop reason: HOSPADM

## 2021-07-28 RX ORDER — FAMOTIDINE 20 MG/1
20 TABLET, FILM COATED ORAL 2 TIMES DAILY
Qty: 20 TABLET | Refills: 0 | Status: SHIPPED | OUTPATIENT
Start: 2021-07-28 | End: 2021-08-07

## 2021-07-28 RX ORDER — ONDANSETRON 4 MG/1
4 TABLET, ORALLY DISINTEGRATING ORAL
Qty: 10 TABLET | Refills: 0 | Status: SHIPPED | OUTPATIENT
Start: 2021-07-28

## 2021-07-28 RX ORDER — FAMOTIDINE 10 MG/ML
20 INJECTION INTRAVENOUS
Status: COMPLETED | OUTPATIENT
Start: 2021-07-28 | End: 2021-07-28

## 2021-07-28 RX ADMIN — ALUMINUM HYDROXIDE AND MAGNESIUM HYDROXIDE 40 ML: 200; 200 SUSPENSION ORAL at 05:50

## 2021-07-28 RX ADMIN — FAMOTIDINE 20 MG: 10 INJECTION, SOLUTION INTRAVENOUS at 05:50

## 2021-07-28 RX ADMIN — SODIUM CHLORIDE 500 ML: 900 INJECTION, SOLUTION INTRAVENOUS at 07:42

## 2021-07-28 NOTE — ED PROVIDER NOTES
EMERGENCY DEPARTMENT HISTORY AND PHYSICAL EXAM      Date: 7/28/2021  Patient Name: Anitha Villasenor    History of Presenting Illness     Chief Complaint   Patient presents with    Nausea     nausea and dizziness started in the AM. Pt has hx of chf, DM2.     Dizziness       History Provided By: Patient    HPI: Anitha Villasenor, 49 y.o. male with PMHx significant for type 2 diabetes, congestive heart failure, hypertension, COPD presents to the ED with chief complaint of nausea and lightheadedness when he woke up this morning to use the restroom at 3 AM.  Patient also states he was feeling jittery and had some heartburn. He denies any fevers or chills. He was concerned about a possible heart attack. Took a aspirin 81 mg. Laid down and felt a little better, but his symptoms did not go away completely so he decided to come in for evaluation. He does report some palpitations. He denies shortness of breath, abdominal pain, constipation, diarrhea, dysuria, hematuria, urinary frequency. Denies any calf pain, leg swelling. PCP: Eduin Sellers NP    No current facility-administered medications on file prior to encounter. Current Outpatient Medications on File Prior to Encounter   Medication Sig Dispense Refill    mupirocin calcium (Bactroban NasaL) 2 % nasal ointment Apply  to affected area two (2) times a day. Apply to affected area BID 1 g 0    metFORMIN (GLUCOPHAGE) 1,000 mg tablet TAKE 1 TABLET BY MOUTH TWICE A DAY WITH MEALS 60 Tab 3    losartan (COZAAR) 100 mg tablet TAKE 1 TABLET BY MOUTH EVERY DAY 23 Tab 0    Lancets misc The patient is to check his fasting blood sugar once each day. 1 Each 11    mometasone-formoterol (DULERA) 100-5 mcg/actuation HFA inhaler Take 2 Puffs by inhalation two (2) times a day. 1 Inhaler 12    fluticasone-salmeterol (ADVAIR) 250-50 mcg/dose diskus inhaler Take 1 Puff by inhalation two (2) times a day.  1 Inhaler 12    pioglitazone (ACTOS) 45 mg tablet Take 1 Tab by mouth nightly. 30 Tab 12    UMECLIDINIUM BRM/VILANTEROL TR (ANORO ELLIPTA IN) Take 1 Puff by inhalation daily.  naproxen sodium (ALEVE) 220 mg cap Take 1 Cap by mouth every twelve (12) hours as needed (Back Pain).  OXYGEN-AIR DELIVERY SYSTEMS 3 L/min by Both Nostrils route continuous.  albuterol (PROVENTIL HFA, VENTOLIN HFA, PROAIR HFA) 90 mcg/actuation inhaler Take 1 Puff by inhalation every six (6) hours as needed for Wheezing. 1 Inhaler 0    losartan (COZAAR) 100 mg tablet TAKE 1 TABLET BY MOUTH EVERY DAY(NON PAYMENT OF INS) 353 Tab 0       Past History     Past Medical History:  Past Medical History:   Diagnosis Date    Diabetes ([facility] [address]     type 2    Heart failure ([facility] [address]     Hypertension     Sleep apnea        Past Surgical History:  No past surgical history on file. Family History:  No family history on file. Social History:  Social History     Tobacco Use    Smoking status: Current Every Day Smoker     Packs/day: 1.00    Smokeless tobacco: Never Used   Substance Use Topics    Alcohol use: No     Comment: occ    Drug use: No       Allergies: Allergies   Allergen Reactions    Iodine Hives    Lisinopril Cough         Review of Systems   Review of Systems   Constitutional: Negative for chills and fever. HENT: Negative for congestion, ear pain, rhinorrhea and sore throat. Eyes: Negative for visual disturbance. Respiratory: Negative for cough, chest tightness, shortness of breath and wheezing. Cardiovascular: Positive for chest pain and palpitations. Gastrointestinal: Positive for nausea. Negative for abdominal pain and vomiting. Genitourinary: Negative for dysuria, flank pain and hematuria. Musculoskeletal: Negative for back pain and myalgias. Neurological: Positive for light-headedness. Negative for syncope. Hematological: Negative. Psychiatric/Behavioral: Negative for confusion. The patient is nervous/anxious.     All other systems reviewed and are negative.       Physical Exam    General appearance -morbidly obese, well appearing, and in no distress  Eyes - pupils equal and reactive, extraocular eye movements intact  ENT - mucous membranes moist, pharynx normal without lesions  Neck - supple, no significant adenopathy; non-tender to palpation  Chest - clear to auscultation, no wheezes, rales or rhonchi; non-tender to palpation  Heart - normal rate and regular rhythm, S1 and S2 normal, no murmurs noted  Abdomen - soft, no abdominal tenderness, nondistended, no masses or organomegaly  Musculoskeletal - no joint tenderness, deformity or swelling; normal ROM  Extremities - peripheral pulses normal, trace bilateral pedal edema  Skin - normal coloration and turgor, no rashes  Neurological - alert, oriented x3, normal speech, no focal findings or movement disorder noted    Diagnostic Study Results     Labs -     Recent Results (from the past 12 hour(s))   EKG, 12 LEAD, INITIAL    Collection Time: 07/28/21  4:26 AM   Result Value Ref Range    Ventricular Rate 75 BPM    Atrial Rate 75 BPM    P-R Interval 126 ms    QRS Duration 96 ms    Q-T Interval 390 ms    QTC Calculation (Bezet) 435 ms    Calculated P Axis 28 degrees    Calculated R Axis -8 degrees    Calculated T Axis 14 degrees    Diagnosis       Normal sinus rhythm  Low voltage QRS  Cannot rule out Inferior infarct , age undetermined  Cannot rule out Anterior infarct (cited on or before 28-JUL-2021)  When compared with ECG of 10-MAR-2017 20:22,  Left posterior fascicular block is no longer present  Minimal criteria for Inferior infarct are now present  Questionable change in initial forces of Anterior leads  QT has lengthened     CBC WITH AUTOMATED DIFF    Collection Time: 07/28/21  4:38 AM   Result Value Ref Range    WBC 7.4 4.1 - 11.1 K/uL    RBC 5.01 4.10 - 5.70 M/uL    HGB 15.0 12.1 - 17.0 g/dL    HCT 46.5 36.6 - 50.3 %    MCV 92.8 80.0 - 99.0 FL    MCH 29.9 26.0 - 34.0 PG    MCHC 32.3 30.0 - 36.5 g/dL    RDW 11.9 11.5 - 14.5 %    PLATELET 216 476 - 304 K/uL    MPV 10.3 8.9 - 12.9 FL    NRBC 0.0 0  WBC    ABSOLUTE NRBC 0.00 0.00 - 0.01 K/uL    NEUTROPHILS 64 32 - 75 %    LYMPHOCYTES 24 12 - 49 %    MONOCYTES 9 5 - 13 %    EOSINOPHILS 2 0 - 7 %    BASOPHILS 1 0 - 1 %    IMMATURE GRANULOCYTES 0 0.0 - 0.5 %    ABS. NEUTROPHILS 4.8 1.8 - 8.0 K/UL    ABS. LYMPHOCYTES 1.8 0.8 - 3.5 K/UL    ABS. MONOCYTES 0.7 0.0 - 1.0 K/UL    ABS. EOSINOPHILS 0.1 0.0 - 0.4 K/UL    ABS. BASOPHILS 0.1 0.0 - 0.1 K/UL    ABS. IMM. GRANS. 0.0 0.00 - 0.04 K/UL    DF AUTOMATED     METABOLIC PANEL, COMPREHENSIVE    Collection Time: 07/28/21  4:38 AM   Result Value Ref Range    Sodium 140 136 - 145 mmol/L    Potassium 3.6 3.5 - 5.1 mmol/L    Chloride 104 97 - 108 mmol/L    CO2 30 21 - 32 mmol/L    Anion gap 6 5 - 15 mmol/L    Glucose 133 (H) 65 - 100 mg/dL    BUN 15 6 - 20 MG/DL    Creatinine 0.86 0.70 - 1.30 MG/DL    BUN/Creatinine ratio 17 12 - 20      GFR est AA >60 >60 ml/min/1.73m2    GFR est non-AA >60 >60 ml/min/1.73m2    Calcium 8.8 8.5 - 10.1 MG/DL    Bilirubin, total 0.6 0.2 - 1.0 MG/DL    ALT (SGPT) 54 12 - 78 U/L    AST (SGOT) 30 15 - 37 U/L    Alk.  phosphatase 57 45 - 117 U/L    Protein, total 7.2 6.4 - 8.2 g/dL    Albumin 3.7 3.5 - 5.0 g/dL    Globulin 3.5 2.0 - 4.0 g/dL    A-G Ratio 1.1 1.1 - 2.2     LIPASE    Collection Time: 07/28/21  4:38 AM   Result Value Ref Range    Lipase 174 73 - 393 U/L   CK W/ CKMB & INDEX    Collection Time: 07/28/21  4:38 AM   Result Value Ref Range    CK - MB 4.5 (H) <3.6 NG/ML    CK-MB Index 2.3 0.0 - 2.5       39 - 308 U/L   MAGNESIUM    Collection Time: 07/28/21  4:38 AM   Result Value Ref Range    Magnesium 2.0 1.6 - 2.4 mg/dL   TROPONIN I    Collection Time: 07/28/21  4:38 AM   Result Value Ref Range    Troponin-I, Qt. <0.05 <0.05 ng/mL   TSH 3RD GENERATION    Collection Time: 07/28/21  4:38 AM   Result Value Ref Range    TSH 4.72 (H) 0.36 - 3.74 uIU/mL   POC TROPONIN-I    Collection Time: 07/28/21  4:41 AM   Result Value Ref Range    Troponin-I (POC) <0.04 0.00 - 0.08 ng/mL   D DIMER    Collection Time: 07/28/21  5:54 AM   Result Value Ref Range    D-dimer 0.37 0.00 - 0.65 mg/L FEU   SAMPLES BEING HELD    Collection Time: 07/28/21  5:54 AM   Result Value Ref Range    SAMPLES BEING HELD [address] PST     COMMENT        Add-on orders for these samples will be processed based on acceptable specimen integrity and analyte stability, which may vary by analyte. Radiologic Studies -   XR CHEST PORT    (Results Pending)     CT Results  (Last 48 hours)    None        CXR Results  (Last 48 hours)    None            Medical Decision Making   I am the first provider for this patient. I reviewed the vital signs, available nursing notes, past medical history, past surgical history, family history and social history. Vital Signs-Reviewed the patient's vital signs. Patient Vitals for the past 12 hrs:   Temp Pulse Resp BP SpO2   07/28/21 0658    (!) 140/85 92 %   07/28/21 0656    (!) 150/95 93 %   07/28/21 0655     94 %   07/28/21 0654    136/82 94 %   07/28/21 0545    137/79 93 %   07/28/21 0500    (!) 143/85 94 %   07/28/21 0427 98.6 °F (37 °C) 81 18 (!) 156/84 93 %       EKG: Normal sinus rhythm, 75 bpm, normal axis, normal MS, QRS, QTc intervals, no ischemic changes    Records Reviewed: Nursing Notes and Old Medical Records    Provider Notes (Medical Decision Making):   Differential diagnosis: Acute coronary syndrome, GERD, chest wall strain, dehydration, orthostatic hypotension  We'll check CBC, CMP, CPK, troponin, D-dimer, chest x-ray, orthostatic vital signs    ED Course:   Initial assessment performed. The patients presenting problems have been discussed, and they are in agreement with the care plan formulated and outlined with them. I have encouraged them to ask questions as they arise throughout their visit.     Progress Notes:  ED Course as of Jul 28 0837 Wed Jul 28, 2021 8278 Patient is feeling much better in the ED after Pepcid and GI cocktail. Will repeat troponin. D-dimer is negative.    [AO]   [id number] Patient is not orthostatic.  UA is unremarkable. Repeat troponin is normal.  Will encourage follow-up with PCP. [AO]      ED Course User Index  [AO] Cee Perez MD       Disposition:  Discharge home    PLAN:  1. Current Discharge Medication List        2. Follow-up Information     Follow up With Specialties Details Why Contact Info    Carly Liao NP Nurse Practitioner Schedule an appointment as soon as possible for a visit   [phone]       [facility] EMERGENCY DEPT Emergency Medicine Schedule an appointment as soon as possible for a visit   [address] [place] [address] Drive  [address] [facility]  [phone]        Return to ED if worse     Diagnosis     Clinical Impression:   1. Gastroesophageal reflux disease, unspecified whether esophagitis present    2.  Chest discomfort

## 2021-07-28 NOTE — Clinical Note
[address]  [facility] EMERGENCY DEPT  [address] [place] [address]  [phone]    Work/School Note    Date: 7/28/2021    To Whom It May concern:      Viki Abad was seen and treated today in the emergency room by the following provider(s):  Attending Provider: Cee Perez MD.      Viki Abad is excused from work/school on 07/28/21. He is clear to return to work/school on 07/29/21.         Sincerely,          Devika Wright MD

## 2021-07-28 NOTE — Clinical Note
[address]  [facility] EMERGENCY DEPT  [address] [address]  [phone]    Work/School Note    Date: 7/28/2021    To Whom It May concern:    Nelli Coulter was seen and treated today in the emergency room by the following provider(s):  Attending Provider: Cee Perez MD.      Nelli Coulter is excused from work/school on 07/28/21 and 07/29/21. He is medically clear to return to work/school on 7/30/2021.        Sincerely,          Patel Simmons MD

## 2021-07-28 NOTE — ED NOTES
Time on phone to arrange transportation home from 902am-912am; Arranged pt transport through [address] [facility] they ask for Us to call back in 30 minutes for an ETA  Confirmation # [id number]

## 2022-11-03 NOTE — PROGRESS NOTES
Patient refused MRI. States he will not be able to tolerate laying flat and is claustrophobic. Claritin ordered daily per Dr. Kathe Bean. Bedside shift change report given to Francisco Chin RN(oncoming nurse) by [facility] , RN(offgoing nurse). Report included the following information SBAR, Kardex, ED Summary, Procedure Summary, Intake/Output, MAR, Accordion and Recent Results. Vital signs stable, call bell within reach, patient in no apparent distress. Hourly Rounding performed by [facility].   Varney Dubin, RN Spoke to wife about what tests would be necessary for his upcoming appointment on 12/12.

## 2023-05-11 RX ORDER — ONDANSETRON 4 MG/1
TABLET, ORALLY DISINTEGRATING ORAL EVERY 8 HOURS PRN
COMMUNITY
Start: 2021-07-28

## 2023-05-11 RX ORDER — LOSARTAN POTASSIUM 100 MG/1
1 TABLET ORAL DAILY
COMMUNITY
Start: 2016-03-08

## 2023-05-11 RX ORDER — FLUTICASONE PROPIONATE AND SALMETEROL 250; 50 UG/1; UG/1
1 POWDER RESPIRATORY (INHALATION) 2 TIMES DAILY
COMMUNITY
Start: 2017-05-03

## 2023-05-11 RX ORDER — LANCETS 30 GAUGE
EACH MISCELLANEOUS
COMMUNITY
Start: 2017-05-18

## 2023-05-11 RX ORDER — COVID-19 ANTIGEN TEST
1 KIT MISCELLANEOUS
COMMUNITY
Start: 2017-03-17

## 2023-05-11 RX ORDER — PIOGLITAZONEHYDROCHLORIDE 45 MG/1
TABLET ORAL
COMMUNITY
Start: 2017-05-03

## 2023-05-11 RX ORDER — ALBUTEROL SULFATE 90 UG/1
1 AEROSOL, METERED RESPIRATORY (INHALATION) EVERY 6 HOURS PRN
COMMUNITY
Start: 2017-03-17

## 2025-01-11 ENCOUNTER — HOSPITAL ENCOUNTER (EMERGENCY)
Facility: HOSPITAL | Age: 55
Discharge: HOME | End: 2025-01-12
Attending: STUDENT IN AN ORGANIZED HEALTH CARE EDUCATION/TRAINING PROGRAM
Payer: MEDICAID

## 2025-01-11 ENCOUNTER — APPOINTMENT (OUTPATIENT)
Facility: HOSPITAL | Age: 55
End: 2025-01-11
Payer: MEDICAID

## 2025-01-11 DIAGNOSIS — N13.30 HYDRONEPHROSIS OF LEFT KIDNEY: ICD-10-CM

## 2025-01-11 DIAGNOSIS — M54.50 ACUTE RIGHT-SIDED LOW BACK PAIN WITHOUT SCIATICA: ICD-10-CM

## 2025-01-11 DIAGNOSIS — N20.1 LEFT URETERAL STONE: Primary | ICD-10-CM

## 2025-01-11 LAB
ALBUMIN SERPL-MCNC: 3.9 G/DL (ref 3.5–5)
ALBUMIN/GLOB SERPL: 1.1 (ref 1.1–2.2)
ALP SERPL-CCNC: 73 U/L (ref 45–117)
ALT SERPL-CCNC: 56 U/L (ref 12–78)
ANION GAP SERPL CALC-SCNC: 6 MMOL/L (ref 2–12)
APPEARANCE UR: CLEAR
AST SERPL-CCNC: 31 U/L (ref 15–37)
BACTERIA URNS QL MICRO: NEGATIVE /HPF
BASOPHILS # BLD: 0.06 K/UL (ref 0–0.1)
BASOPHILS NFR BLD: 0.6 % (ref 0–1)
BILIRUB SERPL-MCNC: 0.3 MG/DL (ref 0.2–1)
BILIRUB UR QL: NEGATIVE
BUN SERPL-MCNC: 16 MG/DL (ref 6–20)
BUN/CREAT SERPL: 13 (ref 12–20)
CALCIUM SERPL-MCNC: 9.2 MG/DL (ref 8.5–10.1)
CHLORIDE SERPL-SCNC: 101 MMOL/L (ref 97–108)
CO2 SERPL-SCNC: 31 MMOL/L (ref 21–32)
COLOR UR: ABNORMAL
CREAT SERPL-MCNC: 1.27 MG/DL (ref 0.7–1.3)
DIFFERENTIAL METHOD BLD: NORMAL
EOSINOPHIL # BLD: 0.09 K/UL (ref 0–0.4)
EOSINOPHIL NFR BLD: 0.9 % (ref 0–7)
EPITH CASTS URNS QL MICRO: ABNORMAL /LPF
ERYTHROCYTE [DISTWIDTH] IN BLOOD BY AUTOMATED COUNT: 11.7 % (ref 11.5–14.5)
GLOBULIN SER CALC-MCNC: 3.5 G/DL (ref 2–4)
GLUCOSE SERPL-MCNC: 142 MG/DL (ref 65–100)
GLUCOSE UR STRIP.AUTO-MCNC: NEGATIVE MG/DL
HCT VFR BLD AUTO: 44.4 % (ref 36.6–50.3)
HGB BLD-MCNC: 14.5 G/DL (ref 12.1–17)
HGB UR QL STRIP: ABNORMAL
HYALINE CASTS URNS QL MICRO: ABNORMAL /LPF (ref 0–5)
IMM GRANULOCYTES # BLD AUTO: 0.04 K/UL (ref 0–0.04)
IMM GRANULOCYTES NFR BLD AUTO: 0.4 % (ref 0–0.5)
KETONES UR QL STRIP.AUTO: NEGATIVE MG/DL
LEUKOCYTE ESTERASE UR QL STRIP.AUTO: NEGATIVE
LIPASE SERPL-CCNC: 61 U/L (ref 13–75)
LYMPHOCYTES # BLD: 1.66 K/UL (ref 0.8–3.5)
LYMPHOCYTES NFR BLD: 16.2 % (ref 12–49)
MCH RBC QN AUTO: 29.8 PG (ref 26–34)
MCHC RBC AUTO-ENTMCNC: 32.7 G/DL (ref 30–36.5)
MCV RBC AUTO: 91.4 FL (ref 80–99)
MONOCYTES # BLD: 0.71 K/UL (ref 0–1)
MONOCYTES NFR BLD: 6.9 % (ref 5–13)
NEUTS SEG # BLD: 7.69 K/UL (ref 1.8–8)
NEUTS SEG NFR BLD: 75 % (ref 32–75)
NITRITE UR QL STRIP.AUTO: NEGATIVE
NRBC # BLD: 0 K/UL (ref 0–0.01)
NRBC BLD-RTO: 0 PER 100 WBC
PH UR STRIP: 5.5 (ref 5–8)
PLATELET # BLD AUTO: 236 K/UL (ref 150–400)
PMV BLD AUTO: 9.7 FL (ref 8.9–12.9)
POTASSIUM SERPL-SCNC: 3.8 MMOL/L (ref 3.5–5.1)
PROT SERPL-MCNC: 7.4 G/DL (ref 6.4–8.2)
PROT UR STRIP-MCNC: NEGATIVE MG/DL
RBC # BLD AUTO: 4.86 M/UL (ref 4.1–5.7)
RBC #/AREA URNS HPF: ABNORMAL /HPF (ref 0–5)
SODIUM SERPL-SCNC: 138 MMOL/L (ref 136–145)
SP GR UR REFRACTOMETRY: 1.01 (ref 1–1.03)
URINE CULTURE IF INDICATED: ABNORMAL
UROBILINOGEN UR QL STRIP.AUTO: 0.2 EU/DL (ref 0.2–1)
WBC # BLD AUTO: 10.3 K/UL (ref 4.1–11.1)
WBC URNS QL MICRO: ABNORMAL /HPF (ref 0–4)

## 2025-01-11 PROCEDURE — 85025 COMPLETE CBC W/AUTO DIFF WBC: CPT

## 2025-01-11 PROCEDURE — 6370000000 HC RX 637 (ALT 250 FOR IP): Performed by: STUDENT IN AN ORGANIZED HEALTH CARE EDUCATION/TRAINING PROGRAM

## 2025-01-11 PROCEDURE — 81001 URINALYSIS AUTO W/SCOPE: CPT

## 2025-01-11 PROCEDURE — 6360000002 HC RX W HCPCS: Performed by: STUDENT IN AN ORGANIZED HEALTH CARE EDUCATION/TRAINING PROGRAM

## 2025-01-11 PROCEDURE — 74176 CT ABD & PELVIS W/O CONTRAST: CPT

## 2025-01-11 PROCEDURE — 96374 THER/PROPH/DIAG INJ IV PUSH: CPT

## 2025-01-11 PROCEDURE — 83690 ASSAY OF LIPASE: CPT

## 2025-01-11 PROCEDURE — 80053 COMPREHEN METABOLIC PANEL: CPT

## 2025-01-11 PROCEDURE — 36415 COLL VENOUS BLD VENIPUNCTURE: CPT

## 2025-01-11 PROCEDURE — 99284 EMERGENCY DEPT VISIT MOD MDM: CPT

## 2025-01-11 RX ORDER — OXYCODONE AND ACETAMINOPHEN 5; 325 MG/1; MG/1
1 TABLET ORAL
Status: COMPLETED | OUTPATIENT
Start: 2025-01-11 | End: 2025-01-11

## 2025-01-11 RX ORDER — TAMSULOSIN HYDROCHLORIDE 0.4 MG/1
0.4 CAPSULE ORAL
Status: COMPLETED | OUTPATIENT
Start: 2025-01-11 | End: 2025-01-11

## 2025-01-11 RX ORDER — TAMSULOSIN HYDROCHLORIDE 0.4 MG/1
0.4 CAPSULE ORAL DAILY
Qty: 30 CAPSULE | Refills: 0 | Status: SHIPPED | OUTPATIENT
Start: 2025-01-11

## 2025-01-11 RX ORDER — KETOROLAC TROMETHAMINE 30 MG/ML
15 INJECTION, SOLUTION INTRAMUSCULAR; INTRAVENOUS ONCE
Status: COMPLETED | OUTPATIENT
Start: 2025-01-11 | End: 2025-01-11

## 2025-01-11 RX ORDER — OXYCODONE AND ACETAMINOPHEN 5; 325 MG/1; MG/1
1 TABLET ORAL EVERY 6 HOURS PRN
Qty: 12 TABLET | Refills: 0 | Status: SHIPPED | OUTPATIENT
Start: 2025-01-11 | End: 2025-01-14

## 2025-01-11 RX ADMIN — TAMSULOSIN HYDROCHLORIDE 0.4 MG: 0.4 CAPSULE ORAL at 22:51

## 2025-01-11 RX ADMIN — OXYCODONE HYDROCHLORIDE AND ACETAMINOPHEN 1 TABLET: 5; 325 TABLET ORAL at 22:51

## 2025-01-11 RX ADMIN — KETOROLAC TROMETHAMINE 15 MG: 30 INJECTION, SOLUTION INTRAMUSCULAR at 21:48

## 2025-01-11 ASSESSMENT — PAIN - FUNCTIONAL ASSESSMENT
PAIN_FUNCTIONAL_ASSESSMENT: ACTIVITIES ARE NOT PREVENTED
PAIN_FUNCTIONAL_ASSESSMENT: PREVENTS OR INTERFERES SOME ACTIVE ACTIVITIES AND ADLS

## 2025-01-11 ASSESSMENT — PAIN DESCRIPTION - DESCRIPTORS
DESCRIPTORS: ACHING
DESCRIPTORS: DISCOMFORT

## 2025-01-11 ASSESSMENT — PAIN DESCRIPTION - LOCATION
LOCATION: FLANK;ABDOMEN
LOCATION: FLANK

## 2025-01-11 ASSESSMENT — PAIN DESCRIPTION - ORIENTATION
ORIENTATION: LEFT
ORIENTATION: RIGHT

## 2025-01-11 ASSESSMENT — PAIN SCALES - GENERAL
PAINLEVEL_OUTOF10: 8
PAINLEVEL_OUTOF10: 8

## 2025-01-12 VITALS
OXYGEN SATURATION: 95 % | TEMPERATURE: 98.2 F | DIASTOLIC BLOOD PRESSURE: 88 MMHG | HEART RATE: 67 BPM | SYSTOLIC BLOOD PRESSURE: 136 MMHG | RESPIRATION RATE: 16 BRPM

## 2025-01-12 NOTE — ED PROVIDER NOTES
[AL]   2144 Hemoglobin Quant: 14.5 [AL]   2144 Platelet Count: 236 [AL]   2217 Lipase: 61 [AL]   2301 Bacteria, UA: Negative [AL]   2301 Nitrite, Urine: Negative [AL]   2301 Leukocyte Esterase, Urine: Negative [AL]   2301 Blood, Urine(!): SMALL [AL]      ED Course User Index  [AL] Michael Mansfield MD         Laboratory Results:  Labs Reviewed   COMPREHENSIVE METABOLIC PANEL - Abnormal; Notable for the following components:       Result Value    Glucose 142 (*)     All other components within normal limits   URINALYSIS WITH REFLEX TO CULTURE - Abnormal; Notable for the following components:    Blood, Urine SMALL (*)     All other components within normal limits   CBC WITH AUTO DIFFERENTIAL   LIPASE     ED physician interpretation of laboratory results: Documented in ED course    Imaging Results:  CT ABDOMEN PELVIS WO CONTRAST Additional Contrast? None   Final Result   3 mm proximal left ureteral stone with left hydronephrosis and perinephric   stranding.      Electronically signed by GASPER WESLEY        ED physician interpretation of imaging: Documented in ED course    Medications Given:  Medications   ketorolac (TORADOL) injection 15 mg (15 mg IntraVENous Given 1/11/25 2148)   oxyCODONE-acetaminophen (PERCOCET) 5-325 MG per tablet 1 tablet (1 tablet Oral Given 1/11/25 2251)   tamsulosin (FLOMAX) capsule 0.4 mg (0.4 mg Oral Given 1/11/25 2251)       Differential Diagnosis included but not limited to: Left flank pain, kidney stone, low back pain, right flank pain, chronic back pain, pyelonephritis    Medical Decision Making  Patient is a 54-year-old male presented to the ED with left flank pain no severe electrolyte brought to the ED but is now somewhat better pain radiated over to his right side right flank.  Patient found to have a 3 mm left ureteral stone with some mild hydronephrosis.  No signs of urinary tract infection.  Patient's pain is controlled.  Patient has right-sided pain as well but I suspect that is

## 2025-01-12 NOTE — DISCHARGE INSTRUCTIONS
You presented to ED with acute left flank pain that radiated around to the front of your abdomen.  CT scan shows a 3 mm stone in the ureter that likely caused your pain in that location.  3 mm stones generally pass on their own without much intervention.  However they can be painful.  Continue taking diclofenac.  Percocet prescribed for breakthrough pain.  Additionally you have right flank pain/right low back pain.  No pathology seen on CT scan on that side.  This could be a muscle skeletal type pain or referred pain from the kidney stone.

## 2025-01-12 NOTE — ED NOTES
Patient reports throbbing abd/back/ flank pain  3/10 that have gotten worse throughout the day.